# Patient Record
Sex: MALE | Race: WHITE | NOT HISPANIC OR LATINO | Employment: OTHER
[De-identification: names, ages, dates, MRNs, and addresses within clinical notes are randomized per-mention and may not be internally consistent; named-entity substitution may affect disease eponyms.]

---

## 2017-03-28 ENCOUNTER — MYAURORA ACCOUNT LINK (OUTPATIENT)
Dept: OTHER | Age: 77
End: 2017-03-28

## 2017-03-28 ENCOUNTER — CHARTING TRANS (OUTPATIENT)
Dept: URGENT CARE | Age: 77
End: 2017-03-28

## 2017-03-28 ASSESSMENT — PAIN SCALES - GENERAL: PAINLEVEL_OUTOF10: 7

## 2017-03-31 ENCOUNTER — CHARTING TRANS (OUTPATIENT)
Dept: OTHER | Age: 77
End: 2017-03-31

## 2017-04-14 ENCOUNTER — LAB SERVICES (OUTPATIENT)
Dept: OTHER | Age: 77
End: 2017-04-14

## 2017-04-14 LAB
ALBUMIN SERPL BCG-MCNC: 3.9 G/DL (ref 3.6–5.1)
ALP SERPL-CCNC: 65 U/L (ref 30–130)
ALT SERPL W/O P-5'-P-CCNC: 13 U/L (ref 10–35)
AST SERPL-CCNC: 9 U/L (ref 9–37)
BILIRUB SERPL-MCNC: 0.6 MG/DL (ref 0–1)
BUN SERPL-MCNC: 19 MG/DL (ref 6–27)
CALCIUM SERPL-MCNC: 9.5 MG/DL (ref 8.6–10.6)
CHLORIDE SERPL-SCNC: 104 MMOL/L (ref 96–107)
CHOLEST SERPL-MCNC: 165 MG/DL (ref 140–200)
CREATININE, SERUM: 1.8 MG/DL (ref 0.6–1.6)
EST. AVERAGE GLUCOSE BLD GHB EST-MCNC: 189 MG/DL (ref 0–154)
GFR SERPL CREATININE-BSD FRML MDRD: 37 ML/MIN/{1.73M2}
GFR SERPL CREATININE-BSD FRML MDRD: 45 ML/MIN/{1.73M2}
GLUCOSE P FAST SERPL-MCNC: 162 MG/DL (ref 60–100)
HBA1C MFR BLD: 8.2 % (ref 4.2–6)
HCO3 SERPL-SCNC: 28 MMOL/L (ref 22–32)
HDLC SERPL-MCNC: 43 MG/DL
LDLC SERPL CALC-MCNC: 88 MG/DL (ref 0–100)
POTASSIUM SERPL-SCNC: 4.8 MMOL/L (ref 3.5–5.3)
PROT SERPL-MCNC: 6.9 G/DL (ref 6.2–8.1)
PSA SERPL-MCNC: 5.08 NG/ML (ref 0–6.4)
SODIUM SERPL-SCNC: 141 MMOL/L (ref 136–146)
TRIGL SERPL-MCNC: 167 MG/DL (ref 0–200)

## 2017-04-20 ENCOUNTER — CHARTING TRANS (OUTPATIENT)
Dept: OTHER | Age: 77
End: 2017-04-20

## 2017-04-22 ENCOUNTER — CHARTING TRANS (OUTPATIENT)
Dept: OTHER | Age: 77
End: 2017-04-22

## 2017-04-22 ENCOUNTER — LAB SERVICES (OUTPATIENT)
Dept: OTHER | Age: 77
End: 2017-04-22

## 2017-04-22 LAB
BILIRUBIN URINE: NEGATIVE
BLOOD URINE: NEGATIVE
CLARITY: CLEAR
COLOR: YELLOW
GLUCOSE QUALITATIVE U: NEGATIVE
KETONES, URINE: NEGATIVE
LEUKOCYTE ESTERASE URINE: ABNORMAL
NITRITE URINE: NEGATIVE
PH URINE: 5 (ref 5–7)
SPECIFIC GRAVITY URINE: 1.02 (ref 1–1.03)
URINE PROTEIN, QUAL (DIPSTICK): 100
UROBILINOGEN URINE: <2

## 2017-04-24 LAB
ALBUMIN/CREAT UR: 293.2 MG/G (ref 0–30)
CREAT UR-MCNC: 123.8 MG/DL
FINAL REPORT: NORMAL
MICROALBUMIN UR-MCNC: 362.9 MG/L

## 2017-04-25 ENCOUNTER — IMAGING SERVICES (OUTPATIENT)
Dept: OTHER | Age: 77
End: 2017-04-25

## 2017-05-02 ENCOUNTER — CHARTING TRANS (OUTPATIENT)
Dept: OTHER | Age: 77
End: 2017-05-02

## 2017-05-09 ENCOUNTER — CHARTING TRANS (OUTPATIENT)
Dept: NEPHROLOGY | Age: 77
End: 2017-05-09

## 2017-05-09 ENCOUNTER — CHARTING TRANS (OUTPATIENT)
Dept: OTHER | Age: 77
End: 2017-05-09

## 2017-05-09 ENCOUNTER — MYAURORA ACCOUNT LINK (OUTPATIENT)
Dept: OTHER | Age: 77
End: 2017-05-09

## 2017-05-09 ENCOUNTER — LAB SERVICES (OUTPATIENT)
Dept: OTHER | Age: 77
End: 2017-05-09

## 2017-05-09 LAB
BUN SERPL-MCNC: 13 MG/DL (ref 6–27)
CALCIUM SERPL-MCNC: 9.4 MG/DL (ref 8.6–10.6)
CHLORIDE SERPL-SCNC: 103 MMOL/L (ref 96–107)
CREATININE, SERUM: 1.2 MG/DL (ref 0.6–1.6)
DIFFERENTIAL TYPE: ABNORMAL
GFR SERPL CREATININE-BSD FRML MDRD: 59 ML/MIN/{1.73M2}
GFR SERPL CREATININE-BSD FRML MDRD: >60 ML/MIN/{1.73M2}
GLUCOSE SERPL-MCNC: 134 MG/DL (ref 70–200)
HCO3 SERPL-SCNC: 26 MMOL/L (ref 22–32)
HEMATOCRIT: 42 % (ref 40–51)
HEMOGLOBIN: 14.6 G/DL (ref 13.7–17.5)
MEAN CORPUSCULAR HGB CONCENTRATION: 34.8 % (ref 32–36)
MEAN CORPUSCULAR HGB: 32.2 PG (ref 27–34)
MEAN CORPUSCULAR VOLUME: 92.5 FL (ref 79–95)
MEAN PLATELET VOLUME: 10 FL (ref 8.6–12.4)
PLATELET COUNT: 245 10*3/UL (ref 150–400)
POTASSIUM SERPL-SCNC: 4.7 MMOL/L (ref 3.5–5.3)
RED BLOOD CELL COUNT: 4.54 10*6/UL (ref 3.9–5.7)
RED CELL DISTRIBUTION WIDTH: 13.9 % (ref 11.3–14.8)
SODIUM SERPL-SCNC: 140 MMOL/L (ref 136–146)
WHITE BLOOD CELL COUNT: 11.2 10*3/UL (ref 4–10)

## 2017-05-11 LAB
IGA SERPL-MCNC: 211 MG/DL (ref 82–453)
IGG SERPL-MCNC: 1070 MG/DL (ref 751–1560)
IGM SERPL-MCNC: 46 MG/DL (ref 46–304)
KAPPA LC FREE SER-MCNC: 11.6 MG/DL (ref 0.33–1.94)
KAPPA LC/LAMBDA SER: 4.16 {RATIO} (ref 0.26–1.65)
LAMBDA LC FREE SERPL-MCNC: 2.79 MG/DL (ref 0.57–2.63)
PATH SERP REV: ABNORMAL

## 2017-05-16 ENCOUNTER — CHARTING TRANS (OUTPATIENT)
Dept: UROLOGY | Age: 77
End: 2017-05-16

## 2017-05-28 ENCOUNTER — CHARTING TRANS (OUTPATIENT)
Dept: OTHER | Age: 77
End: 2017-05-28

## 2017-05-31 ENCOUNTER — CHARTING TRANS (OUTPATIENT)
Dept: OTHER | Age: 77
End: 2017-05-31

## 2017-06-13 ENCOUNTER — CHARTING TRANS (OUTPATIENT)
Dept: HEMATOLOGY/ONCOLOGY | Age: 77
End: 2017-06-13

## 2017-06-13 ASSESSMENT — PAIN SCALES - GENERAL: PAINLEVEL_OUTOF10: 0

## 2017-06-14 ENCOUNTER — CHARTING TRANS (OUTPATIENT)
Dept: OTHER | Age: 77
End: 2017-06-14

## 2017-06-20 ENCOUNTER — LAB SERVICES (OUTPATIENT)
Dept: OTHER | Age: 77
End: 2017-06-20

## 2017-06-20 LAB
ALBUMIN SERPL BCG-MCNC: 4.1 G/DL (ref 3.6–5.1)
ALP SERPL-CCNC: 84 U/L (ref 30–130)
ALT SERPL W/O P-5'-P-CCNC: 30 U/L (ref 17–47)
AST SERPL-CCNC: 17 U/L (ref 14–43)
BASOPHIL %: 0.5 % (ref 0–1.2)
BASOPHIL ABSOLUTE #: 0.1 10*3/UL (ref 0–0.1)
BILIRUB SERPL-MCNC: 0.6 MG/DL (ref 0–1.3)
BUN SERPL-MCNC: 17 MG/DL (ref 6–27)
CALCIUM SERPL-MCNC: 9.4 MG/DL (ref 8.6–10.6)
CHLORIDE SERPL-SCNC: 105 MMOL/L (ref 96–107)
CREATININE, SERUM: 1.4 MG/DL (ref 0.6–1.6)
DIFFERENTIAL TYPE: ABNORMAL
EOSINOPHIL %: 9 % (ref 0–10)
EOSINOPHIL ABSOLUTE #: 1 10*3/UL (ref 0–0.5)
GFR SERPL CREATININE-BSD FRML MDRD: 49 ML/MIN/{1.73M2}
GFR SERPL CREATININE-BSD FRML MDRD: 60 ML/MIN/{1.73M2}
GLUCOSE SERPL-MCNC: 147 MG/DL (ref 70–200)
HCO3 SERPL-SCNC: 23 MMOL/L (ref 22–32)
HEMATOCRIT: 45.5 % (ref 40–51)
HEMOGLOBIN: 15.7 G/DL (ref 13.7–17.5)
LYMPH PERCENT: 15.8 % (ref 20.5–51.1)
LYMPHOCYTE ABSOLUTE #: 1.8 10*3/UL (ref 1.2–3.4)
MEAN CORPUSCULAR HGB CONCENTRATION: 34.5 % (ref 32–36)
MEAN CORPUSCULAR HGB: 32.7 PG (ref 27–34)
MEAN CORPUSCULAR VOLUME: 94.8 FL (ref 79–95)
MEAN PLATELET VOLUME: 11.3 FL (ref 8.6–12.4)
MONOCYTE ABSOLUTE #: 1 10*3/UL (ref 0.2–0.9)
MONOCYTE PERCENT: 9.3 % (ref 4.3–12.9)
NEUTROPHIL ABSOLUTE #: 7.3 10*3/UL (ref 1.4–6.5)
NEUTROPHIL PERCENT: 65.4 % (ref 34–73.5)
PLATELET COUNT: 254 10*3/UL (ref 150–400)
POTASSIUM SERPL-SCNC: 4.8 MMOL/L (ref 3.5–5.3)
PROT SERPL-MCNC: 7.3 G/DL (ref 6.4–8.5)
RED BLOOD CELL COUNT: 4.8 10*6/UL (ref 3.9–5.7)
RED CELL DISTRIBUTION WIDTH: 13.6 % (ref 11.3–14.8)
SODIUM SERPL-SCNC: 140 MMOL/L (ref 136–146)
WHITE BLOOD CELL COUNT: 11.2 10*3/UL (ref 4–10)

## 2017-06-21 ENCOUNTER — IMAGING SERVICES (OUTPATIENT)
Dept: OTHER | Age: 77
End: 2017-06-21

## 2017-06-21 LAB — B2 MICROGLOB SERPL-MCNC: 3.9 MG/L (ref 1.2–3.5)

## 2017-06-23 LAB
IGA SERPL-MCNC: 252 MG/DL (ref 82–453)
IGG SERPL-MCNC: 1170 MG/DL (ref 751–1560)
IGM SERPL-MCNC: 57 MG/DL (ref 46–304)
KAPPA LC FREE SER-MCNC: 12.5 MG/DL (ref 0.33–1.94)
KAPPA LC/LAMBDA SER: 4.66 {RATIO} (ref 0.26–1.65)
LAMBDA LC FREE SERPL-MCNC: 2.68 MG/DL (ref 0.57–2.63)
PATH INTERP SPEC-IMP: ABNORMAL
PATH SERP REV: ABNORMAL
PROT 24H UR-MRATE: 1102.5 MG/24 HR (ref 0–148)
PROT ?TM UR-MCNC: 45 MG/DL

## 2017-06-27 ENCOUNTER — CHARTING TRANS (OUTPATIENT)
Dept: HEMATOLOGY/ONCOLOGY | Age: 77
End: 2017-06-27

## 2017-06-27 ENCOUNTER — CHARTING TRANS (OUTPATIENT)
Dept: OTHER | Age: 77
End: 2017-06-27

## 2017-06-27 ASSESSMENT — PAIN SCALES - GENERAL: PAINLEVEL_OUTOF10: 0

## 2017-07-05 ENCOUNTER — CHARTING TRANS (OUTPATIENT)
Dept: OTHER | Age: 77
End: 2017-07-05

## 2017-07-19 ENCOUNTER — LAB SERVICES (OUTPATIENT)
Dept: OTHER | Age: 77
End: 2017-07-19

## 2017-07-19 ENCOUNTER — CHARTING TRANS (OUTPATIENT)
Dept: HEMATOLOGY/ONCOLOGY | Age: 77
End: 2017-07-19

## 2017-07-19 LAB
DIFFERENTIAL TYPE: ABNORMAL
HEMATOCRIT: 47.9 % (ref 40–51)
HEMOGLOBIN: 16.4 G/DL (ref 13.7–17.5)
LYMPH PERCENT: 10.9 % (ref 20.5–51.1)
LYMPHOCYTE ABSOLUTE #: 1.3 10*3/UL (ref 1.2–3.4)
MEAN CORPUSCULAR HGB CONCENTRATION: 34.2 % (ref 32–36)
MEAN CORPUSCULAR HGB: 32 PG (ref 27–34)
MEAN CORPUSCULAR VOLUME: 93.6 FL (ref 79–95)
MEAN PLATELET VOLUME: 9.1 FL (ref 8.6–12.4)
MIXED %: 11.4 % (ref 4.3–12.9)
MIXED ABSOLUTE #: 1.4 10*3/UL (ref 0.2–0.9)
NEUTROPHIL ABSOLUTE #: 9.6 10*3/UL (ref 1.4–6.5)
NEUTROPHIL PERCENT: 77.7 % (ref 34–73.5)
PLATELET COUNT: 245 10*3/UL (ref 150–400)
RED BLOOD CELL COUNT: 5.12 10*6/UL (ref 3.9–5.7)
RED CELL DISTRIBUTION WIDTH: 13 % (ref 11.3–14.8)
WHITE BLOOD CELL COUNT: 12.3 10*3/UL (ref 4–10)

## 2017-07-19 ASSESSMENT — PAIN SCALES - GENERAL: PAINLEVEL_OUTOF10: 0

## 2017-07-20 LAB
SERVICE CMNT-IMP: NORMAL
SPECIMEN SOURCE: NORMAL
SPECIMEN TYPE: NORMAL

## 2017-07-24 LAB — AP REPORT: NORMAL

## 2017-07-25 ENCOUNTER — CHARTING TRANS (OUTPATIENT)
Dept: OTHER | Age: 77
End: 2017-07-25

## 2017-07-25 LAB — FISH MULTIPLE MYELOMA: NORMAL

## 2017-07-26 LAB
CHROMOSOMES ANALYSIS BONE MARROW: NORMAL
SERVICE CMNT-IMP: NORMAL

## 2017-07-27 ENCOUNTER — CHARTING TRANS (OUTPATIENT)
Dept: OTHER | Age: 77
End: 2017-07-27

## 2017-08-22 ENCOUNTER — CHARTING TRANS (OUTPATIENT)
Dept: OTHER | Age: 77
End: 2017-08-22

## 2017-09-05 ENCOUNTER — CHARTING TRANS (OUTPATIENT)
Dept: OTHER | Age: 77
End: 2017-09-05

## 2017-09-05 ENCOUNTER — MYAURORA ACCOUNT LINK (OUTPATIENT)
Dept: OTHER | Age: 77
End: 2017-09-05

## 2017-09-12 ENCOUNTER — CHARTING TRANS (OUTPATIENT)
Dept: OTHER | Age: 77
End: 2017-09-12

## 2017-09-28 ENCOUNTER — CHARTING TRANS (OUTPATIENT)
Dept: OTHER | Age: 77
End: 2017-09-28

## 2017-10-12 ENCOUNTER — LAB SERVICES (OUTPATIENT)
Dept: OTHER | Age: 77
End: 2017-10-12

## 2017-10-12 LAB
ALBUMIN SERPL BCG-MCNC: 4 G/DL (ref 3.6–5.1)
ALP SERPL-CCNC: 63 U/L (ref 30–130)
ALT SERPL W/O P-5'-P-CCNC: 26 U/L (ref 17–47)
AST SERPL-CCNC: 17 U/L (ref 14–43)
BASOPHIL %: 0.6 % (ref 0–1.2)
BASOPHIL ABSOLUTE #: 0.1 10*3/UL (ref 0–0.1)
BILIRUB SERPL-MCNC: 0.6 MG/DL (ref 0–1.3)
BUN SERPL-MCNC: 23 MG/DL (ref 6–27)
CALCIUM SERPL-MCNC: 9.3 MG/DL (ref 8.6–10.6)
CHLORIDE SERPL-SCNC: 105 MMOL/L (ref 96–107)
CREATININE, SERUM: 1.4 MG/DL (ref 0.6–1.6)
DIFFERENTIAL TYPE: ABNORMAL
EOSINOPHIL %: 3.6 % (ref 0–10)
EOSINOPHIL ABSOLUTE #: 0.4 10*3/UL (ref 0–0.5)
EST. AVERAGE GLUCOSE BLD GHB EST-MCNC: 169 MG/DL (ref 0–154)
GFR SERPL CREATININE-BSD FRML MDRD: 49 ML/MIN/{1.73M2}
GFR SERPL CREATININE-BSD FRML MDRD: 60 ML/MIN/{1.73M2}
GLUCOSE SERPL-MCNC: 140 MG/DL (ref 70–200)
HBA1C MFR BLD: 7.5 % (ref 4.2–6)
HCO3 SERPL-SCNC: 25 MMOL/L (ref 22–32)
HEMATOCRIT: 45.9 % (ref 40–51)
HEMOGLOBIN: 15.2 G/DL (ref 13.7–17.5)
LYMPH PERCENT: 16.9 % (ref 20.5–51.1)
LYMPHOCYTE ABSOLUTE #: 1.7 10*3/UL (ref 1.2–3.4)
MEAN CORPUSCULAR HGB CONCENTRATION: 33.1 % (ref 32–36)
MEAN CORPUSCULAR HGB: 31.3 PG (ref 27–34)
MEAN CORPUSCULAR VOLUME: 94.4 FL (ref 79–95)
MEAN PLATELET VOLUME: 10.7 FL (ref 8.6–12.4)
MONOCYTE ABSOLUTE #: 0.9 10*3/UL (ref 0.2–0.9)
MONOCYTE PERCENT: 9 % (ref 4.3–12.9)
NEUTROPHIL ABSOLUTE #: 7 10*3/UL (ref 1.4–6.5)
NEUTROPHIL PERCENT: 69.9 % (ref 34–73.5)
PLATELET COUNT: 240 10*3/UL (ref 150–400)
POTASSIUM SERPL-SCNC: 4.8 MMOL/L (ref 3.5–5.3)
PROT SERPL-MCNC: 7.1 G/DL (ref 6.4–8.5)
RED BLOOD CELL COUNT: 4.86 10*6/UL (ref 3.9–5.7)
RED CELL DISTRIBUTION WIDTH: 13.9 % (ref 11.3–14.8)
SODIUM SERPL-SCNC: 139 MMOL/L (ref 136–146)
WHITE BLOOD CELL COUNT: 10 10*3/UL (ref 4–10)

## 2017-10-13 LAB — B2 MICROGLOB SERPL-MCNC: 3.5 MG/L (ref 1.2–3.5)

## 2017-10-16 ENCOUNTER — LAB SERVICES (OUTPATIENT)
Dept: OTHER | Age: 77
End: 2017-10-16

## 2017-10-16 ENCOUNTER — CHARTING TRANS (OUTPATIENT)
Dept: OTHER | Age: 77
End: 2017-10-16

## 2017-10-17 LAB
IGA SERPL-MCNC: 251 MG/DL (ref 82–453)
IGG SERPL-MCNC: 1140 MG/DL (ref 751–1560)
IGM SERPL-MCNC: 50 MG/DL (ref 46–304)
KAPPA LC FREE SER-MCNC: 10.5 MG/DL (ref 0.33–1.94)
KAPPA LC/LAMBDA SER: 3.98 {RATIO} (ref 0.26–1.65)
LAMBDA LC FREE SERPL-MCNC: 2.64 MG/DL (ref 0.57–2.63)
PATH SERP REV: ABNORMAL

## 2017-10-18 LAB
PATH INTERP SPEC-IMP: ABNORMAL
PROT 24H UR-MRATE: 1593 MG/24 HR (ref 0–148)
PROT ?TM UR-MCNC: 59 MG/DL

## 2017-10-19 ENCOUNTER — CHARTING TRANS (OUTPATIENT)
Dept: HEMATOLOGY/ONCOLOGY | Age: 77
End: 2017-10-19

## 2017-10-19 ASSESSMENT — PAIN SCALES - GENERAL: PAINLEVEL_OUTOF10: 2

## 2017-12-04 ENCOUNTER — CHARTING TRANS (OUTPATIENT)
Dept: OTHER | Age: 77
End: 2017-12-04

## 2017-12-14 ENCOUNTER — CHARTING TRANS (OUTPATIENT)
Dept: OTHER | Age: 77
End: 2017-12-14

## 2018-03-12 ENCOUNTER — CHARTING TRANS (OUTPATIENT)
Dept: OTHER | Age: 78
End: 2018-03-12

## 2018-04-11 ENCOUNTER — LAB SERVICES (OUTPATIENT)
Dept: OTHER | Age: 78
End: 2018-04-11

## 2018-04-11 ENCOUNTER — CHARTING TRANS (OUTPATIENT)
Dept: OTHER | Age: 78
End: 2018-04-11

## 2018-04-11 LAB
ALBUMIN SERPL-MCNC: 4.1 G/DL (ref 3.6–5.1)
ALP SERPL-CCNC: 66 U/L (ref 30–130)
ALT SERPL-CCNC: 23 U/L (ref 17–47)
AST SERPL-CCNC: 20 U/L (ref 14–43)
BILIRUB SERPL-MCNC: 0.5 MG/DL (ref 0–1.3)
BUN SERPL-MCNC: 22 MG/DL (ref 6–27)
CALCIUM SERPL-MCNC: 9.9 MG/DL (ref 8.6–10.6)
CHLORIDE SERPL-SCNC: 104 MMOL/L (ref 96–107)
CHOLEST SERPL-MCNC: 155 MG/DL (ref 140–200)
CO2 SERPL-SCNC: 23 MMOL/L (ref 22–32)
CREAT SERPL-MCNC: 1.5 MG/DL (ref 0.6–1.6)
EST. AVERAGE GLUCOSE BLD GHB EST-MCNC: 179 MG/DL (ref 0–154)
GFR SERPL CREATININE-BSD FRML MDRD: 45 ML/MIN/{1.73M2}
GFR SERPL CREATININE-BSD FRML MDRD: 55 ML/MIN/{1.73M2}
GLUCOSE P FAST SERPL-MCNC: 109 MG/DL (ref 60–100)
HBA1C BLD-MCNC: 7.9 % (ref 4.2–6)
HDLC SERPL-MCNC: 37 MG/DL
LDLC SERPL CALC-MCNC: 73 MG/DL (ref 30–100)
POTASSIUM SERPL-SCNC: 4.6 MMOL/L (ref 3.5–5.3)
PROT SERPL-MCNC: 7.1 G/DL (ref 6.4–8.5)
PSA SERPL-MCNC: 3.04 NG/ML (ref 0–6.4)
SODIUM SERPL-SCNC: 140 MMOL/L (ref 136–146)
TRIGL SERPL-MCNC: 223 MG/DL (ref 0–200)

## 2018-04-15 ENCOUNTER — CHARTING TRANS (OUTPATIENT)
Dept: OTHER | Age: 78
End: 2018-04-15

## 2018-04-16 ENCOUNTER — LAB SERVICES (OUTPATIENT)
Dept: OTHER | Age: 78
End: 2018-04-16

## 2018-04-16 ENCOUNTER — MYAURORA ACCOUNT LINK (OUTPATIENT)
Dept: OTHER | Age: 78
End: 2018-04-16

## 2018-04-16 LAB
ALBUMIN SERPL-MCNC: 4.1 G/DL (ref 3.6–5.1)
ALP SERPL-CCNC: 76 U/L (ref 30–130)
ALT SERPL-CCNC: 31 U/L (ref 17–47)
AST SERPL-CCNC: 16 U/L (ref 14–43)
BASOPHIL %: 0.3 % (ref 0–1.2)
BASOPHIL ABSOLUTE #: 0 10*3/UL (ref 0–0.1)
BILIRUB SERPL-MCNC: 0.3 MG/DL (ref 0–1.3)
BUN SERPL-MCNC: 20 MG/DL (ref 6–27)
CALCIUM SERPL-MCNC: 9.6 MG/DL (ref 8.6–10.6)
CHLORIDE SERPL-SCNC: 100 MMOL/L (ref 96–107)
CO2 SERPL-SCNC: 26 MMOL/L (ref 22–32)
CREAT SERPL-MCNC: 1.6 MG/DL (ref 0.6–1.6)
DIFFERENTIAL TYPE: ABNORMAL
EOSINOPHIL %: 3.8 % (ref 0–10)
EOSINOPHIL ABSOLUTE #: 0.4 10*3/UL (ref 0–0.5)
GFR SERPL CREATININE-BSD FRML MDRD: 42 ML/MIN/{1.73M2}
GFR SERPL CREATININE-BSD FRML MDRD: 51 ML/MIN/{1.73M2}
GLUCOSE SERPL-MCNC: 173 MG/DL (ref 70–200)
HEMATOCRIT: 45.7 % (ref 40–51)
HEMOGLOBIN: 15.7 G/DL (ref 13.7–17.5)
LYMPH PERCENT: 18.6 % (ref 20.5–51.1)
LYMPHOCYTE ABSOLUTE #: 1.8 10*3/UL (ref 1.2–3.4)
MEAN CORPUSCULAR HGB CONCENTRATION: 34.4 % (ref 32–36)
MEAN CORPUSCULAR HGB: 32.6 PG (ref 27–34)
MEAN CORPUSCULAR VOLUME: 95 FL (ref 79–95)
MEAN PLATELET VOLUME: 10 FL (ref 8.6–12.4)
MONOCYTE ABSOLUTE #: 0.9 10*3/UL (ref 0.2–0.9)
MONOCYTE PERCENT: 9.6 % (ref 4.3–12.9)
NEUTROPHIL ABSOLUTE #: 6.5 10*3/UL (ref 1.4–6.5)
NEUTROPHIL PERCENT: 67.7 % (ref 34–73.5)
PLATELET COUNT: 242 10*3/UL (ref 150–400)
POTASSIUM SERPL-SCNC: 5 MMOL/L (ref 3.5–5.3)
PROT SERPL-MCNC: 7.1 G/DL (ref 6.4–8.5)
RED BLOOD CELL COUNT: 4.81 10*6/UL (ref 3.9–5.7)
RED CELL DISTRIBUTION WIDTH: 13.7 % (ref 11.3–14.8)
SODIUM SERPL-SCNC: 139 MMOL/L (ref 136–146)
WHITE BLOOD CELL COUNT: 9.6 10*3/UL (ref 4–10)

## 2018-04-17 LAB
ALBUMIN/CREAT UR: 836.5 MG/G (ref 0–30)
B2 MICROGLOB SERPL-MCNC: 3.4 MG/L (ref 1.2–3.5)
CREAT UR-MCNC: 78.8 MG/DL
MICROALBUMIN UR-MCNC: 659 MG/L

## 2018-04-18 LAB
IGA SERPL-MCNC: 257 MG/DL (ref 82–453)
IGG SERPL-MCNC: 1230 MG/DL (ref 751–1560)
IGM SERPL-MCNC: 54 MG/DL (ref 46–304)
KAPPA LC FREE SER-MCNC: 11.5 MG/DL (ref 0.33–1.94)
KAPPA LC/LAMBDA SER: 4.23 {RATIO} (ref 0.26–1.65)
LAMBDA LC FREE SERPL-MCNC: 2.72 MG/DL (ref 0.57–2.63)
PATH SERP REV: ABNORMAL

## 2018-04-20 ENCOUNTER — CHARTING TRANS (OUTPATIENT)
Dept: OTHER | Age: 78
End: 2018-04-20

## 2018-04-25 ENCOUNTER — CHARTING TRANS (OUTPATIENT)
Dept: OTHER | Age: 78
End: 2018-04-25

## 2018-04-25 ASSESSMENT — PAIN SCALES - GENERAL: PAINLEVEL_OUTOF10: 0

## 2018-04-30 ENCOUNTER — CHARTING TRANS (OUTPATIENT)
Dept: OTHER | Age: 78
End: 2018-04-30

## 2018-04-30 ENCOUNTER — MYAURORA ACCOUNT LINK (OUTPATIENT)
Dept: OTHER | Age: 78
End: 2018-04-30

## 2018-05-04 ENCOUNTER — CHARTING TRANS (OUTPATIENT)
Dept: OTHER | Age: 78
End: 2018-05-04

## 2018-05-16 ENCOUNTER — CHARTING TRANS (OUTPATIENT)
Dept: OTHER | Age: 78
End: 2018-05-16

## 2018-05-17 ENCOUNTER — CHARTING TRANS (OUTPATIENT)
Dept: OTHER | Age: 78
End: 2018-05-17

## 2018-05-25 ENCOUNTER — CHARTING TRANS (OUTPATIENT)
Dept: OTHER | Age: 78
End: 2018-05-25

## 2018-06-05 ENCOUNTER — CHARTING TRANS (OUTPATIENT)
Dept: OTHER | Age: 78
End: 2018-06-05

## 2018-06-05 ENCOUNTER — MYAURORA ACCOUNT LINK (OUTPATIENT)
Dept: OTHER | Age: 78
End: 2018-06-05

## 2018-06-13 ENCOUNTER — CHARTING TRANS (OUTPATIENT)
Dept: OTHER | Age: 78
End: 2018-06-13

## 2018-06-21 ENCOUNTER — CHARTING TRANS (OUTPATIENT)
Dept: OTHER | Age: 78
End: 2018-06-21

## 2018-06-26 ENCOUNTER — CHARTING TRANS (OUTPATIENT)
Dept: OTHER | Age: 78
End: 2018-06-26

## 2018-07-30 ENCOUNTER — CHARTING TRANS (OUTPATIENT)
Dept: OTHER | Age: 78
End: 2018-07-30

## 2018-08-06 ENCOUNTER — CHARTING TRANS (OUTPATIENT)
Dept: OTHER | Age: 78
End: 2018-08-06

## 2018-08-24 ENCOUNTER — CHARTING TRANS (OUTPATIENT)
Dept: OTHER | Age: 78
End: 2018-08-24

## 2018-08-29 ENCOUNTER — CHARTING TRANS (OUTPATIENT)
Dept: OTHER | Age: 78
End: 2018-08-29

## 2018-09-11 ENCOUNTER — MYAURORA ACCOUNT LINK (OUTPATIENT)
Dept: OTHER | Age: 78
End: 2018-09-11

## 2018-09-19 ENCOUNTER — CHARTING TRANS (OUTPATIENT)
Dept: OTHER | Age: 78
End: 2018-09-19

## 2018-09-19 ENCOUNTER — LAB SERVICES (OUTPATIENT)
Dept: OTHER | Age: 78
End: 2018-09-19

## 2018-09-19 LAB
ALBUMIN/CREAT UR: 743.4 MG/G (ref 0–30)
BASOPHIL %: 0.8 % (ref 0–1.2)
BASOPHIL ABSOLUTE #: 0.1 10*3/UL (ref 0–0.1)
CHOLEST SERPL-MCNC: 154 MG/DL (ref 140–200)
CREAT UR-MCNC: 94.2 MG/DL
DIFFERENTIAL TYPE: ABNORMAL
EOSINOPHIL %: 3.8 % (ref 0–10)
EOSINOPHIL ABSOLUTE #: 0.4 10*3/UL (ref 0–0.5)
EST. AVERAGE GLUCOSE BLD GHB EST-MCNC: 182 MG/DL (ref 0–154)
HBA1C BLD-MCNC: 8 % (ref 4.2–6)
HDLC SERPL-MCNC: 36 MG/DL
HEMATOCRIT: 46.3 % (ref 40–51)
HEMOGLOBIN: 15.1 G/DL (ref 13.7–17.5)
LDLC SERPL CALC-MCNC: 70 MG/DL (ref 30–100)
LYMPH PERCENT: 17.5 % (ref 20.5–51.1)
LYMPHOCYTE ABSOLUTE #: 1.7 10*3/UL (ref 1.2–3.4)
MEAN CORPUSCULAR HGB CONCENTRATION: 32.6 % (ref 32–36)
MEAN CORPUSCULAR HGB: 31.3 PG (ref 27–34)
MEAN CORPUSCULAR VOLUME: 95.9 FL (ref 79–95)
MEAN PLATELET VOLUME: 11 FL (ref 8.6–12.4)
MICROALBUMIN UR-MCNC: 700.2 MG/L
MONOCYTE ABSOLUTE #: 0.9 10*3/UL (ref 0.2–0.9)
MONOCYTE PERCENT: 9.2 % (ref 4.3–12.9)
NEUTROPHIL ABSOLUTE #: 6.5 10*3/UL (ref 1.4–6.5)
NEUTROPHIL PERCENT: 68.7 % (ref 34–73.5)
PLATELET COUNT: 233 10*3/UL (ref 150–400)
RED BLOOD CELL COUNT: 4.83 10*6/UL (ref 3.9–5.7)
RED CELL DISTRIBUTION WIDTH: 13.8 % (ref 11.3–14.8)
TRIGL SERPL-MCNC: 241 MG/DL (ref 0–200)
WHITE BLOOD CELL COUNT: 9.5 10*3/UL (ref 4–10)

## 2018-09-20 LAB — B2 MICROGLOB SERPL-MCNC: 3.9 MG/L (ref 1.2–3.5)

## 2018-09-21 LAB
IGA SERPL-MCNC: 266 MG/DL (ref 82–453)
IGG SERPL-MCNC: 1200 MG/DL (ref 751–1560)
IGM SERPL-MCNC: 55 MG/DL (ref 46–304)
KAPPA LC FREE SER-MCNC: 17.3 MG/DL (ref 0.33–1.94)
KAPPA LC/LAMBDA SER: 5.88 {RATIO} (ref 0.26–1.65)
LAMBDA LC FREE SERPL-MCNC: 2.94 MG/DL (ref 0.57–2.63)
PATH SERP REV: ABNORMAL

## 2018-09-26 ENCOUNTER — CHARTING TRANS (OUTPATIENT)
Dept: OTHER | Age: 78
End: 2018-09-26

## 2018-09-26 ENCOUNTER — MYAURORA ACCOUNT LINK (OUTPATIENT)
Dept: OTHER | Age: 78
End: 2018-09-26

## 2018-10-11 ENCOUNTER — CHARTING TRANS (OUTPATIENT)
Dept: OTHER | Age: 78
End: 2018-10-11

## 2018-10-11 ENCOUNTER — MYAURORA ACCOUNT LINK (OUTPATIENT)
Dept: OTHER | Age: 78
End: 2018-10-11

## 2018-10-11 ASSESSMENT — PAIN SCALES - GENERAL: PAINLEVEL_OUTOF10: 0

## 2018-10-12 ENCOUNTER — CHARTING TRANS (OUTPATIENT)
Dept: OTHER | Age: 78
End: 2018-10-12

## 2018-10-12 ENCOUNTER — MYAURORA ACCOUNT LINK (OUTPATIENT)
Dept: OTHER | Age: 78
End: 2018-10-12

## 2018-10-15 ENCOUNTER — LAB SERVICES (OUTPATIENT)
Dept: OTHER | Age: 78
End: 2018-10-15

## 2018-10-15 LAB
BUN SERPL-MCNC: 24 MG/DL (ref 6–27)
CALCIUM SERPL-MCNC: 10.1 MG/DL (ref 8.6–10.6)
CHLORIDE SERPL-SCNC: 103 MMOL/L (ref 96–107)
CO2 SERPL-SCNC: 27 MMOL/L (ref 22–32)
COLLECT DURATION TIME UR: 24 H
CREAT SERPL-MCNC: 1.6 MG/DL (ref 0.6–1.6)
GFR SERPL CREATININE-BSD FRML MDRD: 42 ML/MIN/{1.73M2}
GFR SERPL CREATININE-BSD FRML MDRD: 51 ML/MIN/{1.73M2}
GLUCOSE SERPL-MCNC: 114 MG/DL (ref 70–200)
PHOSPHATE SERPL-MCNC: 4.1 MG/DL (ref 2.5–4.9)
POTASSIUM SERPL-SCNC: 5.1 MMOL/L (ref 3.5–5.3)
PROT 24H UR-MRATE: 1512.5 MG/(24.H) (ref 42–225)
PROT UR-MCNC: 55 MG/DL (ref 0–12)
SODIUM SERPL-SCNC: 141 MMOL/L (ref 136–146)
SPECIMEN VOL ?TM UR: 2750 ML

## 2018-10-16 ENCOUNTER — CHARTING TRANS (OUTPATIENT)
Dept: OTHER | Age: 78
End: 2018-10-16

## 2018-11-09 ENCOUNTER — CHARTING TRANS (OUTPATIENT)
Dept: OTHER | Age: 78
End: 2018-11-09

## 2018-11-28 VITALS
HEIGHT: 69 IN | RESPIRATION RATE: 18 BRPM | TEMPERATURE: 97.5 F | SYSTOLIC BLOOD PRESSURE: 136 MMHG | BODY MASS INDEX: 30.66 KG/M2 | HEART RATE: 67 BPM | WEIGHT: 207 LBS | OXYGEN SATURATION: 96 % | DIASTOLIC BLOOD PRESSURE: 70 MMHG

## 2018-11-28 VITALS
DIASTOLIC BLOOD PRESSURE: 72 MMHG | HEIGHT: 69 IN | BODY MASS INDEX: 30.21 KG/M2 | TEMPERATURE: 98.2 F | RESPIRATION RATE: 18 BRPM | HEART RATE: 91 BPM | SYSTOLIC BLOOD PRESSURE: 122 MMHG | OXYGEN SATURATION: 97 % | WEIGHT: 204 LBS

## 2018-11-28 VITALS
OXYGEN SATURATION: 97 % | HEART RATE: 82 BPM | RESPIRATION RATE: 18 BRPM | WEIGHT: 196 LBS | SYSTOLIC BLOOD PRESSURE: 130 MMHG | TEMPERATURE: 98.8 F | HEIGHT: 69 IN | BODY MASS INDEX: 29.03 KG/M2 | DIASTOLIC BLOOD PRESSURE: 72 MMHG

## 2018-11-28 VITALS
DIASTOLIC BLOOD PRESSURE: 68 MMHG | SYSTOLIC BLOOD PRESSURE: 130 MMHG | RESPIRATION RATE: 16 BRPM | OXYGEN SATURATION: 96 % | TEMPERATURE: 98.3 F | HEART RATE: 101 BPM

## 2018-11-28 VITALS
HEIGHT: 69 IN | WEIGHT: 191 LBS | BODY MASS INDEX: 28.29 KG/M2 | SYSTOLIC BLOOD PRESSURE: 130 MMHG | DIASTOLIC BLOOD PRESSURE: 74 MMHG | HEART RATE: 74 BPM

## 2018-11-28 VITALS
BODY MASS INDEX: 30.42 KG/M2 | DIASTOLIC BLOOD PRESSURE: 82 MMHG | HEART RATE: 76 BPM | WEIGHT: 206 LBS | SYSTOLIC BLOOD PRESSURE: 128 MMHG

## 2018-11-28 VITALS — BODY MASS INDEX: 30.36 KG/M2 | WEIGHT: 205 LBS | HEIGHT: 69 IN | TEMPERATURE: 96.9 F

## 2018-11-28 VITALS
SYSTOLIC BLOOD PRESSURE: 120 MMHG | HEART RATE: 99 BPM | DIASTOLIC BLOOD PRESSURE: 76 MMHG | OXYGEN SATURATION: 93 % | TEMPERATURE: 99.4 F | RESPIRATION RATE: 18 BRPM

## 2018-11-28 VITALS — HEART RATE: 76 BPM | WEIGHT: 208 LBS | SYSTOLIC BLOOD PRESSURE: 128 MMHG | DIASTOLIC BLOOD PRESSURE: 78 MMHG

## 2018-11-29 VITALS
DIASTOLIC BLOOD PRESSURE: 62 MMHG | OXYGEN SATURATION: 97 % | SYSTOLIC BLOOD PRESSURE: 110 MMHG | WEIGHT: 198 LBS | HEART RATE: 58 BPM

## 2018-12-22 ENCOUNTER — TELEPHONE (OUTPATIENT)
Dept: FAMILY MEDICINE | Age: 78
End: 2018-12-22

## 2018-12-24 RX ORDER — GLIPIZIDE 10 MG/1
TABLET ORAL
COMMUNITY
Start: 2018-09-20 | End: 2019-03-18 | Stop reason: SDUPTHER

## 2018-12-24 RX ORDER — FINASTERIDE 5 MG/1
5 TABLET, FILM COATED ORAL
COMMUNITY
Start: 2018-04-27 | End: 2019-03-29 | Stop reason: SDUPTHER

## 2018-12-24 RX ORDER — LISINOPRIL 10 MG/1
TABLET ORAL
COMMUNITY
Start: 2018-06-13 | End: 2020-12-16 | Stop reason: SDUPTHER

## 2018-12-24 RX ORDER — TAMSULOSIN HYDROCHLORIDE 0.4 MG/1
0.8 CAPSULE ORAL
COMMUNITY
Start: 2017-10-16 | End: 2019-01-15 | Stop reason: SDUPTHER

## 2018-12-24 RX ORDER — LANCETS
EACH MISCELLANEOUS
COMMUNITY
Start: 2018-11-09 | End: 2019-04-21 | Stop reason: SDUPTHER

## 2018-12-24 RX ORDER — LANCING DEVICE/LANCETS
KIT MISCELLANEOUS
COMMUNITY
Start: 2018-11-09 | End: 2020-05-11 | Stop reason: ALTCHOICE

## 2018-12-24 RX ORDER — METFORMIN HYDROCHLORIDE 500 MG/1
2000 TABLET, EXTENDED RELEASE ORAL
Qty: 360 TABLET | Refills: 0 | Status: SHIPPED | OUTPATIENT
Start: 2018-12-24 | End: 2019-03-18 | Stop reason: SDUPTHER

## 2018-12-24 RX ORDER — PANTOPRAZOLE SODIUM 40 MG/1
TABLET, DELAYED RELEASE ORAL
COMMUNITY
Start: 2017-04-22 | End: 2019-09-18 | Stop reason: ALTCHOICE

## 2018-12-24 RX ORDER — METFORMIN HYDROCHLORIDE 500 MG/1
TABLET, EXTENDED RELEASE ORAL
COMMUNITY
Start: 2017-09-14 | End: 2018-12-24 | Stop reason: SDUPTHER

## 2018-12-24 RX ORDER — MONTELUKAST SODIUM 10 MG/1
TABLET ORAL
COMMUNITY
Start: 2018-06-21 | End: 2019-09-06 | Stop reason: SDUPTHER

## 2018-12-24 RX ORDER — MINERAL OIL
OIL (ML) MISCELLANEOUS
COMMUNITY
End: 2020-04-29 | Stop reason: ALTCHOICE

## 2019-01-01 ENCOUNTER — EXTERNAL RECORD (OUTPATIENT)
Dept: HEALTH INFORMATION MANAGEMENT | Facility: OTHER | Age: 79
End: 2019-01-01

## 2019-01-09 DIAGNOSIS — Z12.5 SCREENING FOR PROSTATE CANCER: ICD-10-CM

## 2019-01-09 DIAGNOSIS — N18.30 CONTROLLED TYPE 2 DIABETES MELLITUS WITH STAGE 3 CHRONIC KIDNEY DISEASE, WITHOUT LONG-TERM CURRENT USE OF INSULIN (CMD): Primary | ICD-10-CM

## 2019-01-09 DIAGNOSIS — E11.22 CONTROLLED TYPE 2 DIABETES MELLITUS WITH STAGE 3 CHRONIC KIDNEY DISEASE, WITHOUT LONG-TERM CURRENT USE OF INSULIN (CMD): Primary | ICD-10-CM

## 2019-01-10 DIAGNOSIS — D47.2 MGUS (MONOCLONAL GAMMOPATHY OF UNKNOWN SIGNIFICANCE): Primary | ICD-10-CM

## 2019-01-16 RX ORDER — TAMSULOSIN HYDROCHLORIDE 0.4 MG/1
0.8 CAPSULE ORAL DAILY
Qty: 180 CAPSULE | Refills: 1 | Status: SHIPPED | OUTPATIENT
Start: 2019-01-16 | End: 2020-12-14 | Stop reason: SDUPTHER

## 2019-03-05 VITALS
DIASTOLIC BLOOD PRESSURE: 76 MMHG | BODY MASS INDEX: 30.36 KG/M2 | SYSTOLIC BLOOD PRESSURE: 118 MMHG | WEIGHT: 205 LBS | HEIGHT: 69 IN | HEART RATE: 83 BPM

## 2019-03-05 VITALS
HEIGHT: 69 IN | BODY MASS INDEX: 30.81 KG/M2 | SYSTOLIC BLOOD PRESSURE: 142 MMHG | HEART RATE: 68 BPM | DIASTOLIC BLOOD PRESSURE: 82 MMHG | WEIGHT: 208 LBS | RESPIRATION RATE: 20 BRPM | TEMPERATURE: 97.2 F

## 2019-03-05 VITALS
BODY MASS INDEX: 30.36 KG/M2 | TEMPERATURE: 98.1 F | HEART RATE: 83 BPM | DIASTOLIC BLOOD PRESSURE: 76 MMHG | OXYGEN SATURATION: 95 % | RESPIRATION RATE: 18 BRPM | SYSTOLIC BLOOD PRESSURE: 118 MMHG | WEIGHT: 205 LBS | HEIGHT: 69 IN

## 2019-03-05 VITALS — BODY MASS INDEX: 28.63 KG/M2 | WEIGHT: 200 LBS | HEIGHT: 70 IN

## 2019-03-06 VITALS
WEIGHT: 209 LBS | HEART RATE: 88 BPM | OXYGEN SATURATION: 97 % | SYSTOLIC BLOOD PRESSURE: 132 MMHG | DIASTOLIC BLOOD PRESSURE: 70 MMHG | BODY MASS INDEX: 30.86 KG/M2

## 2019-03-06 VITALS
HEART RATE: 80 BPM | WEIGHT: 211 LBS | DIASTOLIC BLOOD PRESSURE: 86 MMHG | TEMPERATURE: 97.9 F | OXYGEN SATURATION: 95 % | BODY MASS INDEX: 31.25 KG/M2 | RESPIRATION RATE: 18 BRPM | SYSTOLIC BLOOD PRESSURE: 140 MMHG | HEIGHT: 69 IN

## 2019-03-06 VITALS
TEMPERATURE: 98 F | DIASTOLIC BLOOD PRESSURE: 70 MMHG | SYSTOLIC BLOOD PRESSURE: 118 MMHG | HEIGHT: 69 IN | RESPIRATION RATE: 16 BRPM | WEIGHT: 205 LBS | BODY MASS INDEX: 30.36 KG/M2 | HEART RATE: 76 BPM

## 2019-03-18 RX ORDER — GLIPIZIDE 10 MG/1
TABLET ORAL
Qty: 135 TABLET | Refills: 0 | Status: SHIPPED | OUTPATIENT
Start: 2019-03-18 | End: 2020-05-11 | Stop reason: ALTCHOICE

## 2019-03-18 RX ORDER — METFORMIN HYDROCHLORIDE 500 MG/1
TABLET, EXTENDED RELEASE ORAL
Qty: 360 TABLET | Refills: 0 | Status: SHIPPED | OUTPATIENT
Start: 2019-03-18 | End: 2019-04-24 | Stop reason: ALTCHOICE

## 2019-03-29 ENCOUNTER — TELEPHONE (OUTPATIENT)
Dept: UROLOGY | Age: 79
End: 2019-03-29

## 2019-03-29 RX ORDER — FINASTERIDE 5 MG/1
TABLET, FILM COATED ORAL
Qty: 90 TABLET | Refills: 2 | Status: SHIPPED | OUTPATIENT
Start: 2019-03-29 | End: 2019-07-09 | Stop reason: SDUPTHER

## 2019-04-15 ENCOUNTER — OFFICE VISIT (OUTPATIENT)
Dept: PODIATRY | Age: 79
End: 2019-04-15

## 2019-04-15 DIAGNOSIS — E11.42 TYPE 2 DIABETES MELLITUS WITH DIABETIC POLYNEUROPATHY, WITHOUT LONG-TERM CURRENT USE OF INSULIN (CMD): Primary | ICD-10-CM

## 2019-04-15 DIAGNOSIS — M20.42 HAMMER TOES OF BOTH FEET: ICD-10-CM

## 2019-04-15 DIAGNOSIS — M20.41 HAMMER TOES OF BOTH FEET: ICD-10-CM

## 2019-04-15 DIAGNOSIS — L60.8 INCURVATED NAIL: ICD-10-CM

## 2019-04-15 PROCEDURE — 99214 OFFICE O/P EST MOD 30 MIN: CPT | Performed by: PODIATRIST

## 2019-04-15 RX ORDER — INSULIN GLARGINE 100 [IU]/ML
30 INJECTION, SOLUTION SUBCUTANEOUS NIGHTLY
COMMUNITY

## 2019-04-16 ENCOUNTER — TELEPHONE (OUTPATIENT)
Dept: INTERNAL MEDICINE | Age: 79
End: 2019-04-16

## 2019-04-17 ENCOUNTER — LAB SERVICES (OUTPATIENT)
Dept: FAMILY MEDICINE | Age: 79
End: 2019-04-17

## 2019-04-17 DIAGNOSIS — E11.22 CONTROLLED TYPE 2 DIABETES MELLITUS WITH STAGE 3 CHRONIC KIDNEY DISEASE, WITHOUT LONG-TERM CURRENT USE OF INSULIN (CMD): ICD-10-CM

## 2019-04-17 DIAGNOSIS — Z12.5 SCREENING FOR PROSTATE CANCER: ICD-10-CM

## 2019-04-17 DIAGNOSIS — N18.30 CONTROLLED TYPE 2 DIABETES MELLITUS WITH STAGE 3 CHRONIC KIDNEY DISEASE, WITHOUT LONG-TERM CURRENT USE OF INSULIN (CMD): ICD-10-CM

## 2019-04-17 LAB
ALBUMIN SERPL-MCNC: 3.7 G/DL (ref 3.6–5.1)
ALP SERPL-CCNC: 67 U/L (ref 45–115)
ALT SERPL W/O P-5'-P-CCNC: 22 U/L (ref 5–49)
AST SERPL-CCNC: 20 U/L (ref 14–43)
BILIRUB SERPL-MCNC: 0.6 MG/DL (ref 0–1.3)
BUN SERPL-MCNC: 24 MG/DL (ref 6–27)
CALCIUM SERPL-MCNC: 9.2 MG/DL (ref 8.6–10.6)
CHLORIDE SERPL-SCNC: 105 MMOL/L (ref 96–107)
CHOLEST SERPL-MCNC: 170 MG/DL (ref 140–200)
CO2 SERPL-SCNC: 25 MMOL/L (ref 22–32)
CREAT SERPL-MCNC: 1.6 MG/DL (ref 0.6–1.6)
EST. AVERAGE GLUCOSE BLD GHB EST-MCNC: 177 MG/DL (ref 0–154)
GFR SERPL CREATININE-BSD FRML MDRD: 42 ML/MIN/{1.73M2}
GFR SERPL CREATININE-BSD FRML MDRD: 51 ML/MIN/{1.73M2}
GLUCOSE P FAST SERPL-MCNC: 157 MG/DL (ref 60–100)
HBA1C BLD-MCNC: 7.8 % (ref 4.2–6)
HDLC SERPL-MCNC: 38 MG/DL
LDLC SERPL CALC-MCNC: 89 MG/DL (ref 30–100)
POTASSIUM SERPL-SCNC: 4.7 MMOL/L (ref 3.5–5.3)
PROT SERPL-MCNC: 7 G/DL (ref 6.4–8.5)
PSA SERPL-MCNC: 1.62 NG/ML (ref 0–6.8)
SODIUM SERPL-SCNC: 138 MMOL/L (ref 136–146)
TRIGL SERPL-MCNC: 214 MG/DL (ref 0–200)

## 2019-04-17 PROCEDURE — 80053 COMPREHEN METABOLIC PANEL: CPT | Performed by: INTERNAL MEDICINE

## 2019-04-17 PROCEDURE — 80061 LIPID PANEL: CPT | Performed by: INTERNAL MEDICINE

## 2019-04-17 PROCEDURE — 36415 COLL VENOUS BLD VENIPUNCTURE: CPT | Performed by: INTERNAL MEDICINE

## 2019-04-17 PROCEDURE — G0103 PSA SCREENING: HCPCS | Performed by: INTERNAL MEDICINE

## 2019-04-17 PROCEDURE — 82043 UR ALBUMIN QUANTITATIVE: CPT | Performed by: INTERNAL MEDICINE

## 2019-04-17 PROCEDURE — 83036 HEMOGLOBIN GLYCOSYLATED A1C: CPT | Performed by: INTERNAL MEDICINE

## 2019-04-17 PROCEDURE — 82570 ASSAY OF URINE CREATININE: CPT | Performed by: INTERNAL MEDICINE

## 2019-04-18 LAB
ALBUMIN/CREAT UR: 610 MG/G (ref 0–30)
CREAT UR-MCNC: 117.2 MG/DL
MICROALBUMIN UR-MCNC: 715.1 MG/L

## 2019-04-23 RX ORDER — LANCETS
EACH MISCELLANEOUS
Qty: 200 EACH | Refills: 3 | Status: SHIPPED | OUTPATIENT
Start: 2019-04-23 | End: 2020-05-11 | Stop reason: ALTCHOICE

## 2019-04-24 ENCOUNTER — OFFICE VISIT (OUTPATIENT)
Dept: DERMATOLOGY | Age: 79
End: 2019-04-24

## 2019-04-24 ENCOUNTER — OFFICE VISIT (OUTPATIENT)
Dept: INTERNAL MEDICINE | Age: 79
End: 2019-04-24

## 2019-04-24 VITALS
HEART RATE: 76 BPM | SYSTOLIC BLOOD PRESSURE: 128 MMHG | TEMPERATURE: 98.7 F | DIASTOLIC BLOOD PRESSURE: 74 MMHG | HEIGHT: 69 IN | WEIGHT: 203 LBS | RESPIRATION RATE: 16 BRPM | BODY MASS INDEX: 30.07 KG/M2

## 2019-04-24 DIAGNOSIS — L57.0 ACTINIC KERATOSES: Primary | ICD-10-CM

## 2019-04-24 DIAGNOSIS — D47.2 MGUS (MONOCLONAL GAMMOPATHY OF UNKNOWN SIGNIFICANCE): ICD-10-CM

## 2019-04-24 DIAGNOSIS — E11.9 CONTROLLED TYPE 2 DIABETES MELLITUS WITHOUT COMPLICATION, WITH LONG-TERM CURRENT USE OF INSULIN (CMD): Primary | ICD-10-CM

## 2019-04-24 DIAGNOSIS — Z79.4 CONTROLLED TYPE 2 DIABETES MELLITUS WITHOUT COMPLICATION, WITH LONG-TERM CURRENT USE OF INSULIN (CMD): Primary | ICD-10-CM

## 2019-04-24 DIAGNOSIS — I10 ESSENTIAL HYPERTENSION: ICD-10-CM

## 2019-04-24 PROCEDURE — 17000 DESTRUCT PREMALG LESION: CPT | Performed by: DERMATOLOGY

## 2019-04-24 PROCEDURE — 99213 OFFICE O/P EST LOW 20 MIN: CPT | Performed by: DERMATOLOGY

## 2019-04-24 PROCEDURE — 99214 OFFICE O/P EST MOD 30 MIN: CPT | Performed by: INTERNAL MEDICINE

## 2019-04-24 PROCEDURE — 17003 DESTRUCT PREMALG LES 2-14: CPT | Performed by: DERMATOLOGY

## 2019-04-24 RX ORDER — CHLORAL HYDRATE 500 MG
2 CAPSULE ORAL DAILY
COMMUNITY

## 2019-04-24 SDOH — HEALTH STABILITY: MENTAL HEALTH: HOW OFTEN DO YOU HAVE A DRINK CONTAINING ALCOHOL?: NEVER

## 2019-04-24 ASSESSMENT — ENCOUNTER SYMPTOMS
FEVER: 0
CHILLS: 0
SHORTNESS OF BREATH: 0

## 2019-05-09 ENCOUNTER — OFFICE VISIT (OUTPATIENT)
Dept: UROLOGY | Age: 79
End: 2019-05-09

## 2019-05-09 DIAGNOSIS — N40.1 BENIGN PROSTATIC HYPERPLASIA WITH URINARY FREQUENCY: ICD-10-CM

## 2019-05-09 DIAGNOSIS — R35.0 BENIGN PROSTATIC HYPERPLASIA WITH URINARY FREQUENCY: ICD-10-CM

## 2019-05-09 DIAGNOSIS — C67.9 MALIGNANT NEOPLASM OF URINARY BLADDER, UNSPECIFIED SITE (CMD): Primary | ICD-10-CM

## 2019-05-09 PROCEDURE — 52000 CYSTOURETHROSCOPY: CPT | Performed by: UROLOGY

## 2019-05-09 RX ORDER — CIPROFLOXACIN 500 MG/1
500 TABLET, FILM COATED ORAL 2 TIMES DAILY
Qty: 2 TABLET | Refills: 0 | Status: SHIPPED | OUTPATIENT
Start: 2019-05-09 | End: 2019-05-10

## 2019-05-30 ENCOUNTER — TELEPHONE (OUTPATIENT)
Dept: SCHEDULING | Age: 79
End: 2019-05-30

## 2019-07-08 RX ORDER — BLOOD SUGAR DIAGNOSTIC
STRIP MISCELLANEOUS
Qty: 200 STRIP | Refills: 0 | Status: SHIPPED | OUTPATIENT
Start: 2019-07-08 | End: 2019-10-08 | Stop reason: SDUPTHER

## 2019-07-09 ENCOUNTER — TELEPHONE (OUTPATIENT)
Dept: UROLOGY | Age: 79
End: 2019-07-09

## 2019-07-09 RX ORDER — FINASTERIDE 5 MG/1
5 TABLET, FILM COATED ORAL DAILY
Qty: 90 TABLET | Refills: 3 | Status: SHIPPED | OUTPATIENT
Start: 2019-07-09 | End: 2020-12-21 | Stop reason: SDUPTHER

## 2019-07-23 ENCOUNTER — TELEPHONE (OUTPATIENT)
Dept: INTERNAL MEDICINE | Age: 79
End: 2019-07-23

## 2019-08-14 ENCOUNTER — TELEPHONE (OUTPATIENT)
Dept: PODIATRY | Age: 79
End: 2019-08-14

## 2019-09-06 RX ORDER — MONTELUKAST SODIUM 10 MG/1
10 TABLET ORAL EVERY EVENING
Qty: 90 TABLET | Refills: 1 | Status: SHIPPED | OUTPATIENT
Start: 2019-09-06 | End: 2020-12-14 | Stop reason: SDUPTHER

## 2019-09-17 ENCOUNTER — LAB SERVICES (OUTPATIENT)
Dept: FAMILY MEDICINE | Age: 79
End: 2019-09-17

## 2019-09-17 DIAGNOSIS — Z79.4 CONTROLLED TYPE 2 DIABETES MELLITUS WITHOUT COMPLICATION, WITH LONG-TERM CURRENT USE OF INSULIN (CMD): ICD-10-CM

## 2019-09-17 DIAGNOSIS — E11.9 CONTROLLED TYPE 2 DIABETES MELLITUS WITHOUT COMPLICATION, WITH LONG-TERM CURRENT USE OF INSULIN (CMD): ICD-10-CM

## 2019-09-17 LAB
ALBUMIN SERPL-MCNC: 4.2 G/DL (ref 3.6–5.1)
ALP SERPL-CCNC: 73 U/L (ref 45–115)
ALT SERPL W/O P-5'-P-CCNC: 19 U/L (ref 5–49)
AST SERPL-CCNC: 18 U/L (ref 14–43)
BILIRUB SERPL-MCNC: 0.5 MG/DL (ref 0–1.3)
BUN SERPL-MCNC: 29 MG/DL (ref 6–27)
CALCIUM SERPL-MCNC: 9.5 MG/DL (ref 8.6–10.6)
CHLORIDE SERPL-SCNC: 106 MMOL/L (ref 96–107)
CHOLEST SERPL-MCNC: 179 MG/DL (ref 140–200)
CO2 SERPL-SCNC: 25 MMOL/L (ref 22–32)
CREAT SERPL-MCNC: 1.9 MG/DL (ref 0.6–1.6)
EST. AVERAGE GLUCOSE BLD GHB EST-MCNC: 170 MG/DL (ref 0–154)
GFR SERPL CREATININE-BSD FRML MDRD: 34 ML/MIN/{1.73M2}
GFR SERPL CREATININE-BSD FRML MDRD: 42 ML/MIN/{1.73M2}
GLUCOSE P FAST SERPL-MCNC: 170 MG/DL (ref 60–100)
HBA1C MFR BLD: 7.6 % (ref 4.2–6)
HDLC SERPL-MCNC: 39 MG/DL
LDLC SERPL CALC-MCNC: 105 MG/DL (ref 30–100)
POTASSIUM SERPL-SCNC: 4.7 MMOL/L (ref 3.5–5.3)
PROT SERPL-MCNC: 7.4 G/DL (ref 6.4–8.5)
SODIUM SERPL-SCNC: 142 MMOL/L (ref 136–146)
TRIGL SERPL-MCNC: 175 MG/DL (ref 0–200)

## 2019-09-17 PROCEDURE — 83036 HEMOGLOBIN GLYCOSYLATED A1C: CPT | Performed by: INTERNAL MEDICINE

## 2019-09-17 PROCEDURE — 80053 COMPREHEN METABOLIC PANEL: CPT | Performed by: INTERNAL MEDICINE

## 2019-09-17 PROCEDURE — 82043 UR ALBUMIN QUANTITATIVE: CPT | Performed by: INTERNAL MEDICINE

## 2019-09-17 PROCEDURE — 36415 COLL VENOUS BLD VENIPUNCTURE: CPT | Performed by: INTERNAL MEDICINE

## 2019-09-17 PROCEDURE — 82570 ASSAY OF URINE CREATININE: CPT | Performed by: INTERNAL MEDICINE

## 2019-09-17 PROCEDURE — 80061 LIPID PANEL: CPT | Performed by: INTERNAL MEDICINE

## 2019-09-18 ENCOUNTER — OFFICE VISIT (OUTPATIENT)
Dept: INTERNAL MEDICINE | Age: 79
End: 2019-09-18

## 2019-09-18 ENCOUNTER — OFFICE VISIT (OUTPATIENT)
Dept: DERMATOLOGY | Age: 79
End: 2019-09-18

## 2019-09-18 VITALS
SYSTOLIC BLOOD PRESSURE: 130 MMHG | WEIGHT: 207 LBS | DIASTOLIC BLOOD PRESSURE: 82 MMHG | HEART RATE: 76 BPM | HEIGHT: 69 IN | BODY MASS INDEX: 30.66 KG/M2 | TEMPERATURE: 98.6 F | RESPIRATION RATE: 16 BRPM

## 2019-09-18 DIAGNOSIS — D47.2 MGUS (MONOCLONAL GAMMOPATHY OF UNKNOWN SIGNIFICANCE): ICD-10-CM

## 2019-09-18 DIAGNOSIS — L71.9 ROSACEA: Primary | ICD-10-CM

## 2019-09-18 DIAGNOSIS — Z00.00 MEDICARE ANNUAL WELLNESS VISIT, SUBSEQUENT: ICD-10-CM

## 2019-09-18 DIAGNOSIS — E11.9 CONTROLLED TYPE 2 DIABETES MELLITUS WITHOUT COMPLICATION, WITH LONG-TERM CURRENT USE OF INSULIN (CMD): Primary | ICD-10-CM

## 2019-09-18 DIAGNOSIS — Z79.4 CONTROLLED TYPE 2 DIABETES MELLITUS WITHOUT COMPLICATION, WITH LONG-TERM CURRENT USE OF INSULIN (CMD): Primary | ICD-10-CM

## 2019-09-18 DIAGNOSIS — N18.30 CHRONIC KIDNEY DISEASE (CKD), STAGE III (MODERATE) (CMD): ICD-10-CM

## 2019-09-18 LAB
ALBUMIN/CREAT UR: 672 MG/G (ref 0–30)
CREAT UR-MCNC: 145 MG/DL
MICROALBUMIN UR-MCNC: 974.2 MG/L

## 2019-09-18 PROCEDURE — G0439 PPPS, SUBSEQ VISIT: HCPCS | Performed by: INTERNAL MEDICINE

## 2019-09-18 PROCEDURE — 99214 OFFICE O/P EST MOD 30 MIN: CPT | Performed by: INTERNAL MEDICINE

## 2019-09-18 PROCEDURE — 99214 OFFICE O/P EST MOD 30 MIN: CPT | Performed by: DERMATOLOGY

## 2019-09-18 RX ORDER — OMEPRAZOLE 20 MG/1
40 TABLET, DELAYED RELEASE ORAL DAILY
COMMUNITY
End: 2020-04-29 | Stop reason: ALTCHOICE

## 2019-09-18 SDOH — HEALTH STABILITY: MENTAL HEALTH: HOW OFTEN DO YOU HAVE A DRINK CONTAINING ALCOHOL?: NOT ASKED

## 2019-09-18 SDOH — HEALTH STABILITY: PHYSICAL HEALTH: ON AVERAGE, HOW MANY MINUTES DO YOU ENGAGE IN EXERCISE AT THIS LEVEL?: 0 MIN

## 2019-09-18 SDOH — HEALTH STABILITY: PHYSICAL HEALTH: ON AVERAGE, HOW MANY DAYS PER WEEK DO YOU ENGAGE IN MODERATE TO STRENUOUS EXERCISE (LIKE A BRISK WALK)?: 0 DAYS

## 2019-09-18 ASSESSMENT — PATIENT HEALTH QUESTIONNAIRE - PHQ9
SUM OF ALL RESPONSES TO PHQ9 QUESTIONS 1 AND 2: 0
2. FEELING DOWN, DEPRESSED OR HOPELESS: NOT AT ALL
SUM OF ALL RESPONSES TO PHQ9 QUESTIONS 1 AND 2: 0
1. LITTLE INTEREST OR PLEASURE IN DOING THINGS: NOT AT ALL

## 2019-09-18 ASSESSMENT — ENCOUNTER SYMPTOMS
FEVER: 0
CHILLS: 0
SHORTNESS OF BREATH: 0

## 2019-10-09 RX ORDER — BLOOD SUGAR DIAGNOSTIC
STRIP MISCELLANEOUS
Qty: 200 STRIP | Refills: 1 | Status: SHIPPED | OUTPATIENT
Start: 2019-10-09 | End: 2020-05-11 | Stop reason: ALTCHOICE

## 2020-03-14 ENCOUNTER — TELEPHONE (OUTPATIENT)
Dept: INTERNAL MEDICINE | Age: 80
End: 2020-03-14

## 2020-03-17 DIAGNOSIS — D47.2 GAMMOPATHY, MONOCLONAL: Primary | ICD-10-CM

## 2020-04-09 ENCOUNTER — TELEPHONE (OUTPATIENT)
Dept: INTERNAL MEDICINE | Age: 80
End: 2020-04-09

## 2020-04-14 ENCOUNTER — TELEPHONE (OUTPATIENT)
Dept: INTERNAL MEDICINE | Age: 80
End: 2020-04-14

## 2020-04-14 DIAGNOSIS — R53.1 WEAKNESS: Primary | ICD-10-CM

## 2020-04-14 DIAGNOSIS — R53.83 FATIGUE, UNSPECIFIED TYPE: ICD-10-CM

## 2020-04-14 DIAGNOSIS — E16.2 LOW BLOOD SUGAR: ICD-10-CM

## 2020-04-15 ENCOUNTER — LAB SERVICES (OUTPATIENT)
Dept: LAB | Age: 80
End: 2020-04-15

## 2020-04-15 ENCOUNTER — OFFICE VISIT (OUTPATIENT)
Dept: INTERNAL MEDICINE | Age: 80
End: 2020-04-15

## 2020-04-15 ENCOUNTER — IMAGING SERVICES (OUTPATIENT)
Dept: GENERAL RADIOLOGY | Age: 80
End: 2020-04-15
Attending: INTERNAL MEDICINE

## 2020-04-15 DIAGNOSIS — R53.1 WEAKNESS: ICD-10-CM

## 2020-04-15 DIAGNOSIS — R30.0 DYSURIA: ICD-10-CM

## 2020-04-15 DIAGNOSIS — R63.4 WEIGHT LOSS: ICD-10-CM

## 2020-04-15 DIAGNOSIS — R53.83 FATIGUE, UNSPECIFIED TYPE: ICD-10-CM

## 2020-04-15 DIAGNOSIS — E16.2 LOW BLOOD SUGAR: ICD-10-CM

## 2020-04-15 DIAGNOSIS — N42.9 DISORDER OF PROSTATE, UNSPECIFIED: ICD-10-CM

## 2020-04-15 DIAGNOSIS — Z79.4 CONTROLLED TYPE 2 DIABETES MELLITUS WITHOUT COMPLICATION, WITH LONG-TERM CURRENT USE OF INSULIN (CMD): Primary | ICD-10-CM

## 2020-04-15 DIAGNOSIS — Z79.4 CONTROLLED TYPE 2 DIABETES MELLITUS WITHOUT COMPLICATION, WITH LONG-TERM CURRENT USE OF INSULIN (CMD): ICD-10-CM

## 2020-04-15 DIAGNOSIS — E11.9 CONTROLLED TYPE 2 DIABETES MELLITUS WITHOUT COMPLICATION, WITH LONG-TERM CURRENT USE OF INSULIN (CMD): ICD-10-CM

## 2020-04-15 DIAGNOSIS — E11.9 CONTROLLED TYPE 2 DIABETES MELLITUS WITHOUT COMPLICATION, WITH LONG-TERM CURRENT USE OF INSULIN (CMD): Primary | ICD-10-CM

## 2020-04-15 DIAGNOSIS — N50.819 TESTICLE PAIN: ICD-10-CM

## 2020-04-15 LAB
ALBUMIN SERPL-MCNC: 3.1 G/DL (ref 3.6–5.1)
ALBUMIN/CREAT UR: 365.1 MG/G (ref 0–30)
ALP SERPL-CCNC: 97 U/L (ref 45–115)
ALT SERPL W/O P-5'-P-CCNC: 18 U/L (ref 5–49)
AST SERPL-CCNC: 27 U/L (ref 14–43)
ATYPICAL LYMPH: 1 (ref 0–4)
BANDS: 2 % (ref 0–5)
BILIRUB SERPL-MCNC: 1 MG/DL (ref 0–1.3)
BILIRUBIN URINE: NEGATIVE
BLOOD URINE: ABNORMAL
BUN SERPL-MCNC: 59 MG/DL (ref 6–27)
CALCIUM SERPL-MCNC: 8.4 MG/DL (ref 8.6–10.6)
CHLORIDE SERPL-SCNC: 106 MMOL/L (ref 96–107)
CHOLEST SERPL-MCNC: 96 MG/DL (ref 140–200)
CLARITY: ABNORMAL
CO2 SERPL-SCNC: 21 MMOL/L (ref 22–32)
COLOR: ABNORMAL
CREAT SERPL-MCNC: 2.5 MG/DL (ref 0.6–1.6)
CREAT UR-MCNC: 195.8 MG/DL
DIFFERENTIAL TYPE: ABNORMAL
EST. AVERAGE GLUCOSE BLD GHB EST-MCNC: 133 MG/DL (ref 0–154)
GFR SERPL CREATININE-BSD FRML MDRD: 25 ML/MIN/{1.73M2}
GFR SERPL CREATININE-BSD FRML MDRD: 30 ML/MIN/{1.73M2}
GLUCOSE QUALITATIVE U: NEGATIVE
GLUCOSE SERPL-MCNC: 73 MG/DL (ref 70–200)
HBA1C MFR BLD: 6.3 % (ref 4.2–6)
HDLC SERPL-MCNC: 19 MG/DL
HEMATOCRIT: 38.3 % (ref 40–51)
HEMOGLOBIN: 12.7 G/DL (ref 13.7–17.5)
KETONES, URINE: NEGATIVE
LDLC SERPL CALC-MCNC: 59 MG/DL (ref 30–100)
LEUKOCYTE ESTERASE URINE: ABNORMAL
LYMPH PERCENT MD: 4 % (ref 20.5–51.1)
LYMPHOCYTE ABSOLUTE # MD: 1.3 /UL (ref 1.2–3.4)
MEAN CORPUSCULAR HGB CONCENTRATION: 33.2 % (ref 32–36)
MEAN CORPUSCULAR HGB: 30.5 PG (ref 27–34)
MEAN CORPUSCULAR VOLUME: 91.8 FL (ref 79–95)
MEAN PLATELET VOLUME: 10 FL (ref 8.6–12.4)
MICROALBUMIN UR-MCNC: 714.8 MG/L
MONOCYTE ABSOLUTE # MD: 2.3 /UL (ref 0.2–0.9)
MONOCYTE PERCENT MD: 9 % (ref 4.3–12.9)
NEUTROPHIL ABSOLUTE # MD: 22 /UL (ref 1.4–6.5)
NEUTROPHIL PERCENT MD: 84 % (ref 34–73.5)
NITRITE URINE: NEGATIVE
PH URINE: 5 (ref 5–7)
PLATELET COUNT: 344 10*3/UL (ref 150–400)
POTASSIUM SERPL-SCNC: 4.5 MMOL/L (ref 3.5–5.3)
PROT SERPL-MCNC: 6.5 G/DL (ref 6.4–8.5)
PSA SERPL-MCNC: 1.65 NG/ML (ref 0–6.8)
RED BLOOD CELL COUNT: 4.17 10*6/UL (ref 3.9–5.7)
RED BLOOD CELLS URINE: ABNORMAL (ref 0–3)
RED CELL DISTRIBUTION WIDTH: 14.1 % (ref 11.3–14.8)
SEDIMENTATION RATE, RBC: 92 MM/H (ref 0–10)
SODIUM SERPL-SCNC: 140 MMOL/L (ref 136–146)
SPECIFIC GRAVITY URINE: 1.02 (ref 1–1.03)
SQUAMOUS EPITHELIAL CELLS: ABNORMAL
TRIGL SERPL-MCNC: 92 MG/DL (ref 0–200)
TSH SERPL DL<=0.05 MIU/L-ACNC: 1.34 M[IU]/L (ref 0.3–4.82)
URINE PROTEIN, QUAL (DIPSTICK): 100
UROBILINOGEN URINE: 2
VIT B12 SERPL-MCNC: 213 PG/ML (ref 193–982)
WBC CLUMPS: ABNORMAL
WHITE BLOOD CELL COUNT: 25.6 10*3/UL (ref 4–10)
WHITE BLOOD CELLS URINE: ABNORMAL (ref 0–5)

## 2020-04-15 PROCEDURE — 84443 ASSAY THYROID STIM HORMONE: CPT | Performed by: INTERNAL MEDICINE

## 2020-04-15 PROCEDURE — 82570 ASSAY OF URINE CREATININE: CPT | Performed by: INTERNAL MEDICINE

## 2020-04-15 PROCEDURE — 85652 RBC SED RATE AUTOMATED: CPT | Performed by: INTERNAL MEDICINE

## 2020-04-15 PROCEDURE — 83036 HEMOGLOBIN GLYCOSYLATED A1C: CPT | Performed by: INTERNAL MEDICINE

## 2020-04-15 PROCEDURE — 80061 LIPID PANEL: CPT | Performed by: INTERNAL MEDICINE

## 2020-04-15 PROCEDURE — 86225 DNA ANTIBODY NATIVE: CPT | Performed by: INTERNAL MEDICINE

## 2020-04-15 PROCEDURE — 36415 COLL VENOUS BLD VENIPUNCTURE: CPT | Performed by: INTERNAL MEDICINE

## 2020-04-15 PROCEDURE — 99215 OFFICE O/P EST HI 40 MIN: CPT | Performed by: INTERNAL MEDICINE

## 2020-04-15 PROCEDURE — 80053 COMPREHEN METABOLIC PANEL: CPT | Performed by: INTERNAL MEDICINE

## 2020-04-15 PROCEDURE — 82043 UR ALBUMIN QUANTITATIVE: CPT | Performed by: INTERNAL MEDICINE

## 2020-04-15 PROCEDURE — 81003 URINALYSIS AUTO W/O SCOPE: CPT | Performed by: INTERNAL MEDICINE

## 2020-04-15 PROCEDURE — 85007 BL SMEAR W/DIFF WBC COUNT: CPT | Performed by: INTERNAL MEDICINE

## 2020-04-15 PROCEDURE — 87086 URINE CULTURE/COLONY COUNT: CPT | Performed by: INTERNAL MEDICINE

## 2020-04-15 PROCEDURE — 71046 X-RAY EXAM CHEST 2 VIEWS: CPT | Performed by: RADIOLOGY

## 2020-04-15 PROCEDURE — 85027 COMPLETE CBC AUTOMATED: CPT | Performed by: INTERNAL MEDICINE

## 2020-04-15 PROCEDURE — 84153 ASSAY OF PSA TOTAL: CPT | Performed by: INTERNAL MEDICINE

## 2020-04-15 PROCEDURE — 82607 VITAMIN B-12: CPT | Performed by: INTERNAL MEDICINE

## 2020-04-15 PROCEDURE — 86038 ANTINUCLEAR ANTIBODIES: CPT | Performed by: INTERNAL MEDICINE

## 2020-04-15 SDOH — HEALTH STABILITY: MENTAL HEALTH: HOW OFTEN DO YOU HAVE A DRINK CONTAINING ALCOHOL?: NOT ASKED

## 2020-04-15 SDOH — HEALTH STABILITY: MENTAL HEALTH
STRESS IS WHEN SOMEONE FEELS TENSE, NERVOUS, ANXIOUS, OR CAN'T SLEEP AT NIGHT BECAUSE THEIR MIND IS TROUBLED. HOW STRESSED ARE YOU?: ONLY A LITTLE

## 2020-04-15 SDOH — HEALTH STABILITY: PHYSICAL HEALTH: ON AVERAGE, HOW MANY DAYS PER WEEK DO YOU ENGAGE IN MODERATE TO STRENUOUS EXERCISE (LIKE A BRISK WALK)?: 0 DAYS

## 2020-04-15 SDOH — HEALTH STABILITY: PHYSICAL HEALTH: ON AVERAGE, HOW MANY MINUTES DO YOU ENGAGE IN EXERCISE AT THIS LEVEL?: 0 MIN

## 2020-04-15 ASSESSMENT — ENCOUNTER SYMPTOMS
DIARRHEA: 0
SHORTNESS OF BREATH: 0
VOMITING: 0
NAUSEA: 0
EYES NEGATIVE: 1
PSYCHIATRIC NEGATIVE: 1
FEVER: 0
FATIGUE: 1
HEMATOLOGIC/LYMPHATIC NEGATIVE: 1
COUGH: 0
APPETITE CHANGE: 1
NUMBNESS: 0
WEAKNESS: 1
RESPIRATORY NEGATIVE: 1
UNEXPECTED WEIGHT CHANGE: 1
BLOOD IN STOOL: 0
BACK PAIN: 0
ABDOMINAL PAIN: 0
SORE THROAT: 0
TROUBLE SWALLOWING: 0
ACTIVITY CHANGE: 1
ALLERGIC/IMMUNOLOGIC NEGATIVE: 1

## 2020-04-15 ASSESSMENT — COGNITIVE AND FUNCTIONAL STATUS - GENERAL
DO YOU HAVE SERIOUS DIFFICULTY WALKING OR CLIMBING STAIRS: NO
BECAUSE OF A PHYSICAL, MENTAL, OR EMOTIONAL CONDITION, DO YOU HAVE SERIOUS DIFFICULTY CONCENTRATING, REMEMBERING OR MAKING DECISIONS: YES
BECAUSE OF A PHYSICAL, MENTAL, OR EMOTIONAL CONDITION, DO YOU HAVE DIFFICULTY DOING ERRANDS ALONE: NO
DO YOU HAVE DIFFICULTY DRESSING OR BATHING: NO

## 2020-04-15 ASSESSMENT — PATIENT HEALTH QUESTIONNAIRE - PHQ9
SUM OF ALL RESPONSES TO PHQ9 QUESTIONS 1 AND 2: 0
SUM OF ALL RESPONSES TO PHQ9 QUESTIONS 1 AND 2: 0
1. LITTLE INTEREST OR PLEASURE IN DOING THINGS: NOT AT ALL
2. FEELING DOWN, DEPRESSED OR HOPELESS: NOT AT ALL

## 2020-04-15 ASSESSMENT — PAIN SCALES - GENERAL: PAINLEVEL: 9-10

## 2020-04-16 ENCOUNTER — TELEPHONE (OUTPATIENT)
Dept: INTERNAL MEDICINE | Age: 80
End: 2020-04-16

## 2020-04-16 LAB — FINAL REPORT: NORMAL

## 2020-04-17 ENCOUNTER — IMAGING SERVICES (OUTPATIENT)
Dept: OTHER | Age: 80
End: 2020-04-17
Attending: INTERNAL MEDICINE

## 2020-04-17 ENCOUNTER — APPOINTMENT (OUTPATIENT)
Dept: NUCLEAR MEDICINE | Facility: HOSPITAL | Age: 80
DRG: 206 | End: 2020-04-17
Attending: PHYSICIAN ASSISTANT
Payer: MEDICARE

## 2020-04-17 ENCOUNTER — APPOINTMENT (OUTPATIENT)
Dept: GENERAL RADIOLOGY | Facility: HOSPITAL | Age: 80
DRG: 206 | End: 2020-04-17
Attending: PHYSICIAN ASSISTANT
Payer: MEDICARE

## 2020-04-17 ENCOUNTER — HOSPITAL ENCOUNTER (INPATIENT)
Facility: HOSPITAL | Age: 80
LOS: 7 days | Discharge: HOME HEALTH | DRG: 206 | End: 2020-04-25
Attending: HOSPITALIST | Admitting: HOSPITALIST
Payer: MEDICARE

## 2020-04-17 ENCOUNTER — APPOINTMENT (OUTPATIENT)
Dept: ULTRASOUND IMAGING | Facility: HOSPITAL | Age: 80
DRG: 206 | End: 2020-04-17
Attending: PHYSICIAN ASSISTANT
Payer: MEDICARE

## 2020-04-17 DIAGNOSIS — N19 RENAL FAILURE, UNSPECIFIED CHRONICITY: ICD-10-CM

## 2020-04-17 DIAGNOSIS — Z79.4 CONTROLLED TYPE 2 DIABETES MELLITUS WITHOUT COMPLICATION, WITH LONG-TERM CURRENT USE OF INSULIN (CMD): Primary | ICD-10-CM

## 2020-04-17 DIAGNOSIS — R63.4 WEIGHT LOSS: ICD-10-CM

## 2020-04-17 DIAGNOSIS — R53.1 WEAKNESS: ICD-10-CM

## 2020-04-17 DIAGNOSIS — R77.8 ELEVATED TROPONIN: ICD-10-CM

## 2020-04-17 DIAGNOSIS — D72.829 LEUKOCYTOSIS, UNSPECIFIED TYPE: ICD-10-CM

## 2020-04-17 DIAGNOSIS — E11.9 CONTROLLED TYPE 2 DIABETES MELLITUS WITHOUT COMPLICATION, WITH LONG-TERM CURRENT USE OF INSULIN (CMD): Primary | ICD-10-CM

## 2020-04-17 DIAGNOSIS — R06.00 DYSPNEA, UNSPECIFIED TYPE: Primary | ICD-10-CM

## 2020-04-17 PROBLEM — R79.89 AZOTEMIA: Status: ACTIVE | Noted: 2020-04-17

## 2020-04-17 PROBLEM — E87.1 HYPONATREMIA: Status: ACTIVE | Noted: 2020-04-17

## 2020-04-17 PROBLEM — R73.9 HYPERGLYCEMIA: Status: ACTIVE | Noted: 2020-04-17

## 2020-04-17 LAB
ANA SER QL IA: NORMAL RATIO (ref 0–0.6)
DSDNA IGG SERPL IA-ACNC: NORMAL [IU]/ML (ref 0–10)

## 2020-04-17 PROCEDURE — 93970 EXTREMITY STUDY: CPT | Performed by: PHYSICIAN ASSISTANT

## 2020-04-17 PROCEDURE — 99223 1ST HOSP IP/OBS HIGH 75: CPT | Performed by: HOSPITALIST

## 2020-04-17 PROCEDURE — 78580 LUNG PERFUSION IMAGING: CPT | Performed by: PHYSICIAN ASSISTANT

## 2020-04-17 PROCEDURE — 71045 X-RAY EXAM CHEST 1 VIEW: CPT | Performed by: PHYSICIAN ASSISTANT

## 2020-04-17 RX ORDER — TAMSULOSIN HYDROCHLORIDE 0.4 MG/1
0.4 CAPSULE ORAL DAILY
COMMUNITY

## 2020-04-17 RX ORDER — HEPARIN SODIUM 5000 [USP'U]/ML
80 INJECTION INTRAVENOUS; SUBCUTANEOUS ONCE
Status: COMPLETED | OUTPATIENT
Start: 2020-04-17 | End: 2020-04-17

## 2020-04-17 RX ORDER — LISINOPRIL 10 MG/1
10 TABLET ORAL DAILY
COMMUNITY

## 2020-04-17 RX ORDER — ACETAMINOPHEN 500 MG
1000 TABLET ORAL ONCE
Status: COMPLETED | OUTPATIENT
Start: 2020-04-17 | End: 2020-04-17

## 2020-04-17 RX ORDER — FINASTERIDE 5 MG/1
5 TABLET, FILM COATED ORAL DAILY
COMMUNITY

## 2020-04-17 RX ORDER — GLIPIZIDE 10 MG/1
10 TABLET ORAL
COMMUNITY
End: 2020-07-17

## 2020-04-17 RX ORDER — MONTELUKAST SODIUM 10 MG/1
10 TABLET ORAL NIGHTLY
COMMUNITY

## 2020-04-17 RX ORDER — OMEPRAZOLE 20 MG/1
20 CAPSULE, DELAYED RELEASE ORAL
COMMUNITY

## 2020-04-17 RX ORDER — MAGNESIUM CARB/ALUMINUM HYDROX 105-160MG
30 TABLET,CHEWABLE ORAL
COMMUNITY

## 2020-04-17 RX ORDER — HEPARIN SODIUM AND DEXTROSE 10000; 5 [USP'U]/100ML; G/100ML
18 INJECTION INTRAVENOUS ONCE
Status: COMPLETED | OUTPATIENT
Start: 2020-04-17 | End: 2020-04-18

## 2020-04-18 ENCOUNTER — APPOINTMENT (OUTPATIENT)
Dept: CV DIAGNOSTICS | Facility: HOSPITAL | Age: 80
DRG: 206 | End: 2020-04-18
Attending: HOSPITALIST
Payer: MEDICARE

## 2020-04-18 ENCOUNTER — APPOINTMENT (OUTPATIENT)
Dept: CT IMAGING | Facility: HOSPITAL | Age: 80
DRG: 206 | End: 2020-04-18
Attending: HOSPITALIST
Payer: MEDICARE

## 2020-04-18 ENCOUNTER — IMAGING SERVICES (OUTPATIENT)
Dept: OTHER | Age: 80
End: 2020-04-18
Attending: HOSPITALIST

## 2020-04-18 ENCOUNTER — APPOINTMENT (OUTPATIENT)
Dept: MRI IMAGING | Facility: HOSPITAL | Age: 80
DRG: 206 | End: 2020-04-18
Attending: INTERNAL MEDICINE
Payer: MEDICARE

## 2020-04-18 ENCOUNTER — IMAGING SERVICES (OUTPATIENT)
Dept: OTHER | Age: 80
End: 2020-04-18
Attending: INTERNAL MEDICINE

## 2020-04-18 PROBLEM — R77.8 ELEVATED TROPONIN: Status: ACTIVE | Noted: 2020-04-18

## 2020-04-18 PROBLEM — D72.829 LEUKOCYTOSIS, UNSPECIFIED TYPE: Status: ACTIVE | Noted: 2020-04-18

## 2020-04-18 PROBLEM — N19 RENAL FAILURE, UNSPECIFIED CHRONICITY: Status: ACTIVE | Noted: 2020-04-18

## 2020-04-18 PROBLEM — R06.00 DYSPNEA, UNSPECIFIED TYPE: Status: ACTIVE | Noted: 2020-04-18

## 2020-04-18 PROCEDURE — 99233 SBSQ HOSP IP/OBS HIGH 50: CPT | Performed by: HOSPITALIST

## 2020-04-18 PROCEDURE — 72157 MRI CHEST SPINE W/O & W/DYE: CPT | Performed by: INTERNAL MEDICINE

## 2020-04-18 PROCEDURE — 93306 TTE W/DOPPLER COMPLETE: CPT | Performed by: HOSPITALIST

## 2020-04-18 PROCEDURE — 72158 MRI LUMBAR SPINE W/O & W/DYE: CPT | Performed by: INTERNAL MEDICINE

## 2020-04-18 PROCEDURE — 99223 1ST HOSP IP/OBS HIGH 75: CPT | Performed by: INTERNAL MEDICINE

## 2020-04-18 PROCEDURE — 74176 CT ABD & PELVIS W/O CONTRAST: CPT | Performed by: HOSPITALIST

## 2020-04-18 PROCEDURE — 71250 CT THORAX DX C-: CPT | Performed by: HOSPITALIST

## 2020-04-18 RX ORDER — ACETAMINOPHEN 500 MG
500 TABLET ORAL ONCE
Status: COMPLETED | OUTPATIENT
Start: 2020-04-18 | End: 2020-04-18

## 2020-04-18 RX ORDER — ONDANSETRON 2 MG/ML
4 INJECTION INTRAMUSCULAR; INTRAVENOUS EVERY 4 HOURS PRN
Status: DISCONTINUED | OUTPATIENT
Start: 2020-04-18 | End: 2020-04-18

## 2020-04-18 RX ORDER — ONDANSETRON 2 MG/ML
4 INJECTION INTRAMUSCULAR; INTRAVENOUS EVERY 6 HOURS PRN
Status: DISCONTINUED | OUTPATIENT
Start: 2020-04-18 | End: 2020-04-25

## 2020-04-18 RX ORDER — HEPARIN SODIUM 5000 [USP'U]/ML
30 INJECTION, SOLUTION INTRAVENOUS; SUBCUTANEOUS ONCE
Status: COMPLETED | OUTPATIENT
Start: 2020-04-18 | End: 2020-04-18

## 2020-04-18 RX ORDER — METOCLOPRAMIDE HYDROCHLORIDE 5 MG/ML
5 INJECTION INTRAMUSCULAR; INTRAVENOUS EVERY 8 HOURS PRN
Status: DISCONTINUED | OUTPATIENT
Start: 2020-04-18 | End: 2020-04-25

## 2020-04-18 RX ORDER — CHLORAL HYDRATE 500 MG
1000 CAPSULE ORAL DAILY
COMMUNITY

## 2020-04-18 RX ORDER — HEPARIN SODIUM AND DEXTROSE 10000; 5 [USP'U]/100ML; G/100ML
INJECTION INTRAVENOUS CONTINUOUS
Status: DISCONTINUED | OUTPATIENT
Start: 2020-04-18 | End: 2020-04-25

## 2020-04-18 RX ORDER — SODIUM CHLORIDE, SODIUM LACTATE, POTASSIUM CHLORIDE, CALCIUM CHLORIDE 600; 310; 30; 20 MG/100ML; MG/100ML; MG/100ML; MG/100ML
INJECTION, SOLUTION INTRAVENOUS CONTINUOUS
Status: DISCONTINUED | OUTPATIENT
Start: 2020-04-18 | End: 2020-04-20

## 2020-04-18 RX ORDER — TAMSULOSIN HYDROCHLORIDE 0.4 MG/1
0.4 CAPSULE ORAL DAILY
Status: DISCONTINUED | OUTPATIENT
Start: 2020-04-18 | End: 2020-04-25

## 2020-04-18 RX ORDER — MONTELUKAST SODIUM 10 MG/1
10 TABLET ORAL NIGHTLY
Status: DISCONTINUED | OUTPATIENT
Start: 2020-04-18 | End: 2020-04-25

## 2020-04-18 RX ORDER — ACETAMINOPHEN 500 MG
1000 TABLET ORAL ONCE
Status: COMPLETED | OUTPATIENT
Start: 2020-04-18 | End: 2020-04-18

## 2020-04-18 RX ORDER — HYDROMORPHONE HYDROCHLORIDE 1 MG/ML
0.5 INJECTION, SOLUTION INTRAMUSCULAR; INTRAVENOUS; SUBCUTANEOUS EVERY 30 MIN PRN
Status: ACTIVE | OUTPATIENT
Start: 2020-04-18 | End: 2020-04-18

## 2020-04-18 RX ORDER — PANTOPRAZOLE SODIUM 20 MG/1
20 TABLET, DELAYED RELEASE ORAL
Status: DISCONTINUED | OUTPATIENT
Start: 2020-04-18 | End: 2020-04-25

## 2020-04-18 RX ORDER — ACETAMINOPHEN 325 MG/1
650 TABLET ORAL EVERY 6 HOURS PRN
Status: DISCONTINUED | OUTPATIENT
Start: 2020-04-18 | End: 2020-04-25

## 2020-04-18 RX ORDER — DEXTROSE MONOHYDRATE 25 G/50ML
50 INJECTION, SOLUTION INTRAVENOUS
Status: DISCONTINUED | OUTPATIENT
Start: 2020-04-18 | End: 2020-04-25

## 2020-04-18 RX ORDER — FINASTERIDE 5 MG/1
5 TABLET, FILM COATED ORAL DAILY
Status: DISCONTINUED | OUTPATIENT
Start: 2020-04-18 | End: 2020-04-25

## 2020-04-18 NOTE — ED INITIAL ASSESSMENT (HPI)
Patient presents to the ER with a mask on. Patient states that he received a call from his doctor and was told to come to the ER for an elevated white blood cell count.  Patient states \"I am here so you can tell me what is wrong, I don't know what is wrong

## 2020-04-18 NOTE — ED PROVIDER NOTES
Patient Seen in: [facility] Emergency Department      History   Patient presents with:  Fatigue  Abnormal Result    Stated Complaint: abnormal wbc, no appetite, fatigue    HPI    70-year-old male with a known history of diabetes, reflux, hypertensio HENT:      Head: Normocephalic and atraumatic. Mouth/Throat:      Mouth: Mucous membranes are moist.      Pharynx: No oropharyngeal exudate.    Eyes:      Conjunctiva/sclera: Conjunctivae normal.      Pupils: Pupils are equal, round, and reactive to Osmolality 305 (*)     GFR, Non- 26 (*)     GFR, -American 30 (*)     AST 57 (*)     Albumin 1.9 (*)     Globulin  5.3 (*)     A/G Ratio 0.4 (*)     All other components within normal limits   TROPONIN I - Abnormal; Notable for the f on the individual orders. URINALYSIS WITH CULTURE REFLEX   RAINBOW DRAW BLUE   RAINBOW DRAW LIGHT GREEN   RAINBOW DRAW GOLD   BLOOD CULTURE   BLOOD CULTURE     EKG    Rate, intervals and axes as noted on EKG Report.   Rate: 101  Rhythm: Sinus Rhythm  Read Heparin drip will be started. Patient to be admitted. Patient was placed on 2 L as he was desaturating to 90 to 93% with conversational dyspnea.   D-dimer elevated over 4      Discussed with [facility] hospitalist who agrees with admission and plan heparin to

## 2020-04-18 NOTE — CONSULTS
INFECTIOUS DISEASE CONSULT NOTE (TELEMEDICINE NOTE)    Jackie Rabago Patient Status:  Inpatient    1940 MRN [id number]   [facility] 3NW-A Attending Sharonda Kenney MD   [facility] mL, 50 mL, Intravenous, Q15 Min PRN **OR** glucose (DEX4) oral liquid 30 g, 30 g, Oral, Q15 Min PRN **OR** Glucose-Vitamin C (DEX-4) chewable tab 8 tablet, 8 tablet, Oral, Q15 Min PRN  •  insulin detemir (LEVEMIR) 100 UNIT/ML flextouch 10 Units, 10 Units, hypoxia- no longer on supplemental oxygen    4.  Significant ferritin elevation    PLAN:    -Repeat COVID-19 testing to better exclude infection  -Anticoagulation per primary service  -Follow CBC, renal function, and inflammatory markers  -Discussed with fl

## 2020-04-18 NOTE — PLAN OF CARE
Patient has temp of 101.8F, , BP of 130/60. Sepsis BPA. Dr. Theresa llamas.  Ordered 1000mg Tylenol, Stat CT of Chest, Abdomen and Pelvis and IV Zosyn    ID was notified, no additional orders

## 2020-04-18 NOTE — PLAN OF CARE
NURSING ADMISSION NOTE    Pt admitted from ED to room 309. Patient admitted via Cart. Oriented to room. Safety precautions initiated. Bed in low position. Call light in reach. Admission navigator completed. Pt a/oxZENIA De La Garza. Denies pain. VSS.  Guillermo Cooper

## 2020-04-18 NOTE — CONSULTS
Hematology/Oncology Initial Consultation Note    Patient Name: Winston Khan  Medical Record Number: [id number]    YOB: 1940   Date of Consultation: 4/18/2020   Physician requesting consultation: Dr. Yudith Estevez    Reason for Consultation for age (36 to 50% plasmacytosis (plasma cell count 11% consistent with involvement by plasma cell dyscrasia. Trilineage hematopoiesis with relative erythroid hyperplasia, myeloid to erythroid ratio 1:1). Congo red stain is negative for amyloid.   Marlene Landrum plt 213, cre 2.0  -4/15/20- wbc 25.6, ANC 22K, AMC 2300, hgb 12.7, plt 344, cre 2.5, DSDNA negative, B12 213, ESR 92, TSH 1.3, PSA 1.3    =============================================  History of Present Illness: 16-year-old male with history of smoldering MG Oral Tab, Take 10 mg by mouth daily. , Disp: , Rfl:   Montelukast Sodium 10 MG Oral Tab, Take 10 mg by mouth nightly., Disp: , Rfl:   omeprazole 20 MG Oral Capsule Delayed Release, Take 20 mg by mouth every morning before breakfast., Disp: , Rfl:   abdullahi --  Pulse: 82 (04/18 0805)  BP: 111/57 (04/18 0805)  Temp: 98 °F (36.7 °C) (04/18 0805)  Do Not Use - Resp Rate: --  SpO2: 96 % (04/18 0805)    Wt Readings from Last 6 Encounters:  04/17/20 : 83.9 kg (185 lb)    Physical Examination:  General: Patient is a -ID consult. Should rule out epidural or spinal abscess given neurologic changes- discussed with Dr. Rohith Adams, rec'd MRI with and w/o contrast, given creCl is 30 is okay to proceed. *smoldering myeloma- light chain disease  -not on treatment.   Glenda Delatorre

## 2020-04-18 NOTE — PLAN OF CARE
Assumed care of patient at 0700  A/Ox4, RA, NSR on tele  Heme/Onc on consult, ordered labs and MRI of spine to rule out spinal abscess  MRI Screening completed  Heparin gtt increased by 150 units per protocol with a bolus of 2500 units of heparin to IV.  He management of diabetes  Outcome: Progressing     Problem: HEMATOLOGIC - ADULT  Goal: Free from bleeding injury  Description  (Example usage: patient with low platelets)  INTERVENTIONS:  - Avoid intramuscular injections, enemas and rectal medication adminis

## 2020-04-18 NOTE — RESPIRATORY THERAPY NOTE
Worn a PPE (gron, gloves, eye protective, N95 mask, cap ) during a nasal swab, pt tolerated well, the specimen sent to the lab by walked.

## 2020-04-18 NOTE — ED NOTES
Called to give report per RN on floor they cannot take patient to unit. Nursing supervisor notified. Per RN supervisor she will reassign room. Pt currently in [facility]

## 2020-04-18 NOTE — ED PROVIDER NOTES
I reviewed that chart and discussed the case. I have examined the patient and noted 24-year-old male who presents here to the emerge department complaint of having shortness of breath, generalized weakness and fatigue.   Patient says that he has been notic

## 2020-04-18 NOTE — PROGRESS NOTES
JANIE HOSPITALIST  Progress Note     [facility] Patient Status:  Inpatient    1940 MRN [id number]   [facility] 3NW-A Attending Aisha Rodriguez MD   Hosp Day # 0 PCP ALICIA [address]     Chief Complaint: SOB  S:  Patient denies tio and HS     ASSESSMENT / PLAN:   1. Dyspnea/Hypoxia- improved. Not convinced its from PE though elevated D Dimer  1. Rapid COVID neg- No symptoms of COVID, will consult ID for recommendations  2.  V/Q unremarkable- US with Chronic b/l  common and superficial

## 2020-04-18 NOTE — H&P
JANIE HOSPITALIST  History and Physical     Dirk Briceño Patient Status:  Emergency    1940 MRN [id number]   Location 6[address] [facility] Attending No att. providers found   [facility] Day # 0 PCP [address]     Chief Complaint: Capsule Delayed Release, Take 20 mg by mouth every morning before breakfast., Disp: , Rfl:   tamsulosin (FLOMAX) cap, Take 0.4 mg by mouth daily. , Disp: , Rfl:   finasteride 5 MG Oral Tab, Take 5 mg by mouth daily. , Disp: , Rfl:         Review of Systems: Chronic b/l  common and superficial femoral veins and acute vs subacute superficial thrombus in b/l saphenous veins; denies known hx of thrombosis  4. Likely PE  5. Check echo  6. Unable to do CTA with MARILUZ/CKD  7.  No symptoms of COVID, would hold on retest

## 2020-04-19 PROCEDURE — 99233 SBSQ HOSP IP/OBS HIGH 50: CPT | Performed by: HOSPITALIST

## 2020-04-19 PROCEDURE — 99232 SBSQ HOSP IP/OBS MODERATE 35: CPT | Performed by: INTERNAL MEDICINE

## 2020-04-19 RX ORDER — HEPARIN SODIUM 5000 [USP'U]/ML
30 INJECTION, SOLUTION INTRAVENOUS; SUBCUTANEOUS ONCE
Status: COMPLETED | OUTPATIENT
Start: 2020-04-19 | End: 2020-04-19

## 2020-04-19 NOTE — PROGRESS NOTES
JANIE HOSPITALIST  Progress Note     Mono Gomez Patient Status:  Inpatient    1940 MRN [id number]   [facility] 3NW-A Attending Harvey Hylton MD   Hosp Day # 1 PCP [address]     Chief Complaint: SOB  S:  Patient denies tio Montelukast Sodium  10 mg Oral Nightly   • Pantoprazole Sodium  20 mg Oral QAM AC   • tamsulosin HCl  0.4 mg Oral Daily   • insulin detemir  10 Units Subcutaneous Nightly   • Insulin Aspart Pen  1-10 Units Subcutaneous TID AC and HS   • piperacillin-tazoba MD

## 2020-04-19 NOTE — PROGRESS NOTES
Hematology/Oncology Progress Note    Patient Name: Kortney Schultz  Medical Record Number: [id number]    YOB: 1940     Reason for Consultation:  Kortney Schultz was seen today for the diagnosis of DVT    Interval events: Patient feels his leg 14.1, plt 213, cre 2.0  -4/15/20- wbc 25.6, ANC 22K, AMC 2300, hgb 12.7, plt 344, cre 2.5, DSDNA negative, B12 213, ESR 92, TSH 1.3, PSA 1.3    Recent Labs   Lab 04/17/20  1933 04/18/20  0640 04/19/20  0556   WBC 27.0* 24.9* 22.8*   HGB 12.3* 11.8* 11.1* Color Urine      Yellow Yellow   CLARITY URINE      Clear Hazy (A)   Spec Gravity      1.001 - 1.030 1.024   Glucose Urine      Negative mg/dl Negative   Bilirubin      Negative Negative   Ketones, UA      Negative mg/dL Negative   Blood Urine      Negat partially decompressed. No biliary ductal dilatation. PANCREAS:  Unremarkable. SPLEEN:  Normal caliber   AORTA/VASCULAR:  Mild aortoiliac calcification. .  No aneurysm. RETROPERITONEUM:  No enlarged adenopathy.     BOWEL/MESENTERY:  Normal caliber PE on V/Q scan.    -reasonable to continue UFH gtt for now while evaluating further since the chronicity is uncertain. *Bilateral hip flexor weakness, low grade fever, elevated inflammatory markers  -concern for infection.   Initial SARS-CoV-2 negative,

## 2020-04-19 NOTE — PROGRESS NOTES
INFECTIOUS DISEASE PROGRESS NOTE (TELEMEDICINE NOTE)    Radha Wallace Patient Status:  Inpatient    1940 MRN [id number]   [facility] 3NW-A Attending Dariel Hernandez MD   [place] of systems was completed. Pertinent positives and negatives noted in the the HPI.     Physical Exam:    Physical examination deferred in setting of COVID-19 pandemic; please see hospitalist notes    Vital signs: Blood pressure 101/65, pulse 87, temperature renal function, and inflammatory markers  -Continue Zosyn for now  -Hematology service following for DVTs and history of smoldering myeloma    Andrea Schneider MD  [address] [place] [address]

## 2020-04-19 NOTE — PLAN OF CARE
Assumed care at [phone] and oriented x4  [facility] with b/l hearing aids, impaired vision with glasses at bedside  Currently on RA, spO2 90's, no cough or SOB noted  Currently afebrile  Heparin gtt, LR, and zosyn infusing  MRI thoracic/lumbar spine completed unsuccessful or patient reports new pain  - Anticipate increased pain with activity and pre-medicate as appropriate  Outcome: Progressing     Problem: RISK FOR INFECTION - ADULT  Goal: Absence of fever/infection during anticipated neutropenic period  Descr pressure on venipuncture sites to achieve adequate hemostasis  - Assess for signs and symptoms of internal bleeding  - Monitor lab trends  - Patient is to report abnormal signs of bleeding to staff  - Avoid use of toothpicks and dental floss  - Use electri

## 2020-04-20 ENCOUNTER — TELEPHONE (OUTPATIENT)
Dept: INTERNAL MEDICINE | Age: 80
End: 2020-04-20

## 2020-04-20 PROCEDURE — 99232 SBSQ HOSP IP/OBS MODERATE 35: CPT | Performed by: INTERNAL MEDICINE

## 2020-04-20 PROCEDURE — 99233 SBSQ HOSP IP/OBS HIGH 50: CPT | Performed by: HOSPITALIST

## 2020-04-20 RX ORDER — SODIUM CHLORIDE 9 MG/ML
INJECTION, SOLUTION INTRAVENOUS CONTINUOUS
Status: DISCONTINUED | OUTPATIENT
Start: 2020-04-20 | End: 2020-04-25

## 2020-04-20 NOTE — PLAN OF CARE
Assumed care of patient at 0730. Patient alert and oriented. Vital signs stable. Sinus rhythm on telemetry. Lungs clear on auscultation. LR infusing per order. Heparin gtt maintained per order. Patient with fever of 101.5 at 1653. Tylenol administered. Assess pain using appropriate pain scale  - Administer analgesics based on type and severity of pain and evaluate response  - Implement non-pharmacological measures as appropriate and evaluate response  - Consider cultural and social influences on pain and restriction as ordered  - Instruct patient on fluid and nutrition restrictions as appropriate  4/19/2020 2016 by Ciarra Garza RN  Outcome: Progressing  4/19/2020 1150 by Ciarra Garza RN  Outcome: Progressing     Problem: HEMATOLOGIC - ADULT

## 2020-04-20 NOTE — PLAN OF CARE
A/OX4, RA, SR per Tele  Tmax-100.6, tylenol given, reassessment 97.8  Denies pain at this time  Heparin gtt infusing per protocol   Needs attended to, Will cont to monitor.         Problem: RISK FOR INFECTION - ADULT  Goal: Absence of fever/infection during

## 2020-04-20 NOTE — PROGRESS NOTES
JANIE HOSPITALIST  Progress Note     Kortney Schultz Patient Status:  Inpatient    1940 MRN [id number]   [facility] 3NW-A Attending Fernando Phillips MD   Hosp Day # 2 PCP ALICIA DUNAWAY     Chief Complaint: SOB    S:  Patient had feve 04/17/20  1933 04/18/20  0221 04/18/20  0640   TROP 0.299* 0.177* 0.142*   CK 66  --   --         Imaging: Imaging data reviewed in Epic.   Medications:   • finasteride  5 mg Oral Daily   • Montelukast Sodium  10 mg Oral Nightly   • Pantoprazole Sodium  20 drip  · CODE status: full  · Rayo: no  Estimated date of discharge: TBD  Case d/w RN, pt, MD.     Jessica MUNGUIA  1:17 PM     Addendum  Patient seen and examined  Chest: CTA B/L  CVS: S1, S2, RRR  ABD: Soft, NT, ND, BS+  EXT: No c/c    Imaging: Review

## 2020-04-20 NOTE — PROGRESS NOTES
Hematology/Oncology Progress Note    Patient Name: Rimma Rosen  Medical Record Number: [id number]    YOB: 1940     Reason for Consultation:  Rimma Rosen was seen today for the diagnosis of DVT    Interval events: Leg strength is sharda 2.5, DSDNA negative, B12 213, ESR 92, TSH 1.3, PSA 1.3    Recent Labs   Lab 04/17/20  1933 04/18/20  0640 04/19/20  0556 04/20/20  0636   WBC 27.0* 24.9* 22.8* 23.7*   HGB 12.3* 11.8* 11.1* 11.3*   HCT 36.9* 34.9* 33.0* 33.5*   .0 379.0 400.0 435.0 (L)     Component      Latest Ref Rng & Units 4/19/2020   Color Urine      Yellow Yellow   CLARITY URINE      Clear Hazy (A)   Spec Gravity      1.001 - 1.030 1.024   Glucose Urine      Negative mg/dl Negative   Bilirubin      Negative Negative   Ketones, hydronephrosis, nephrolithiasis or obstructing urinary calculus. There is moderate perinephric stranding about both kidneys which may be seen with pyelonephritis. 2 cm low-attenuation mass midpole left kidney. ADRENALS:  Normal.  No mass or enlargement. spine.  Specifically no epidural abscess appreciated    Impression & Plan:     *bilateral femoral DVT, appears chronic in nature + acute superficial thrombosis of saphenous veins  -unlikely the cause of his current/presenting symptoms of fatigue, fever and

## 2020-04-20 NOTE — PLAN OF CARE
Assumed care at 0730. A&OX4. Talkative and cooperative. No temps in am, but 101.5 temp at 1600. Tylenol given. Dr. Nakita Webber notified of temp. Pt up in chair periodically today. Antibiotics given. Isolation DC'd. Weakness in hips improving.   Resting patient reports new pain  - Anticipate increased pain with activity and pre-medicate as appropriate  Outcome: Progressing     Problem: METABOLIC/FLUID AND ELECTROLYTES - ADULT  Goal: Glucose maintained within prescribed range  Description  INTERVENTIONS: shaving  - Use soft bristle tooth brush  - Limit straining and forceful nose blowing  Outcome: Progressing     Problem: RISK FOR INFECTION - ADULT  Goal: Absence of fever/infection during anticipated neutropenic period  Description  INTERVENTIONS  - Monito

## 2020-04-21 PROCEDURE — 99232 SBSQ HOSP IP/OBS MODERATE 35: CPT | Performed by: INTERNAL MEDICINE

## 2020-04-21 PROCEDURE — 99233 SBSQ HOSP IP/OBS HIGH 50: CPT | Performed by: HOSPITALIST

## 2020-04-21 NOTE — PROGRESS NOTES
[facility]                INFECTIOUS DISEASE PROGRESS NOTE    Adrián Snow Patient Status:  Inpatient    1940 MRN [id number]   [facility] 3NW-A Attending Kashif Phelps MD   Hosp Day # 3 PCP ALICIA DUNAWAY     Antibiotics: CREATSERUM 2.33* 2.01* 1.78* 1.71*   GFRAA 30* 36* 41* 43*   GFRNAA 26* 31* 36* 38*   CA 8.4* 8.3* 8.1* 7.9*   ALB 1.9*  --  1.4*  --    * 139 140 139   K 4.6 4.0 4.1 4.0    109 110 109   CO2 22.0 23.0 23.0 23.0   ALKPHO 110  --  96  --    AS

## 2020-04-21 NOTE — PLAN OF CARE
Pt to be transferred to room 3601. Verbal report called to RN. Pt updated on Plan of Care and verbalizes understanding.

## 2020-04-21 NOTE — PROGRESS NOTES
JANIE HOSPITALIST  Progress Note     Opal Silverman Patient Status:  Inpatient    1940 MRN [id number]   [facility] 3NW-A Attending Felice Shankar MD   Hosp Day # 3 PCP [address]     Chief Complaint: SOB    S:  Patient reports input(s): PTP, INR in the last 168 hours. Recent Labs   Lab 04/17/20  1933 04/18/20  0221 04/18/20  0640   TROP 0.299* 0.177* 0.142*   CK 66  --   --         Imaging: Imaging data reviewed in Epic.   Medications:   • Insulin Aspart Pen  1-15 Units Subcutan diameter- outpt f/u     Quality:  · DVT Prophylaxis: heparin drip  · CODE status: full  · Rayo: no  Estimated date of discharge: TBD    Case d/w RN, pt.  RN reports no contrast until Friday for indium scan - will f/u on ID recs.       Phyllis MUNGUIA  1

## 2020-04-21 NOTE — PROGRESS NOTES
Pt. Slept well overnight. Moved to chair around 0515. Complaints if back pain, PRN tylenol if needed. VSS overnight-afebrile. RA. . Tele-NSR. Abdomen is soft and rounded. Voids in urinal. Up with standby assist. Heparin drip infusing at 2100 units/hr.  I

## 2020-04-21 NOTE — PHYSICAL THERAPY NOTE
PHYSICAL THERAPY EVALUATION - INPATIENT     Room Number: 154/622-I  Evaluation Date: 4/21/2020  Type of Evaluation: Initial  Physician Order: PT Eval and Treat    Presenting Problem: chronic B DVT, leukocytosis  Reason for Therapy: Mobility Dysfuncti Patient self-stated goal is to get stronger and return home.      OBJECTIVE  Precautions: Hard of hearing  Fall Risk: High fall risk    WEIGHT BEARING RESTRICTION  Weight Bearing Restriction: None                PAIN ASSESSMENT  Ratin          COGNI assistance  Distance (ft): 20  Assistive Device: Rolling walker  Pattern: R Foot flat;L Foot flat          Skilled Therapy Provided: (PPE worn: mask, gloves) Pt received supine in bed and is agreeable to therapy. Pt supine-sit with supervision.  Pt demonstr complexity is considered moderate. These impairments and comorbidities manifest themselves as functional limitations in independent bed mobility, transfers, and gait.  The patient is below baseline and would benefit from skilled inpatient PT to address the

## 2020-04-21 NOTE — PROGRESS NOTES
Hematology/Oncology Progress Note    Patient Name: Denver Mixer  Medical Record Number: [id number]    YOB: 1940     Reason for Consultation:  Denver Mixer was seen today for the diagnosis of DVT    Interval events: Leg strength is about wbc 11.0, ANC 7.9K, , hgb 14.1, plt 213, cre 2.0  -4/15/20- wbc 25.6, ANC 22K, AMC 2300, hgb 12.7, plt 344, cre 2.5, DSDNA negative, B12 213, ESR 92, TSH 1.3, PSA 1.3    Recent Labs   Lab 04/17/20  1933  04/19/20  0556 04/20/20  0636 04/21/20  0549 1/10/19 4.21mcg/mL  Component      Latest Ref Rng & Units 4/19/2020   IMMUNOGLOBULIN A      70.00 - 312.00 mg/dL 229.00   IMMUNOGLOBULIN G      791-1,643 mg/dL 961   IMMUNOGLOBULIN M      43.0 - 279.0 mg/dL 31.6 (L)     Component      Latest Ref Rng & Unit hilar adenopathy. There is a 1.8 cm nodule in the right lobe of the thyroid gland. Eber Slidell CARDIAC:  No significant pericardial effusion. PLEURA:  No pneumothorax or effusion.      ABDOMEN/PELVIS:  KIDNEYS:  No hydronephrosis, nephrolithiasis or obstructing u superimposed central and right paracentral broad-based disc protrusion. There is mild bilateral neural foraminal narrowing. No central canal stenosis. No abnormal enhancement involving the thoracic or lumbar spine.   Specifically no epidural abscess appre [facility]

## 2020-04-21 NOTE — PLAN OF CARE
Pt is AOx4, denies chest pain or dyspnea. Room air. SR on tele. Up with assist of 1. Voids in the urinal.  Heparin gtt infusing, NS infusing. QID accucheck. Hard of hearing. Will monitor.

## 2020-04-22 ENCOUNTER — TELEPHONE (OUTPATIENT)
Dept: INTERNAL MEDICINE | Age: 80
End: 2020-04-22

## 2020-04-22 PROCEDURE — 99232 SBSQ HOSP IP/OBS MODERATE 35: CPT | Performed by: HOSPITALIST

## 2020-04-22 PROCEDURE — 99232 SBSQ HOSP IP/OBS MODERATE 35: CPT | Performed by: INTERNAL MEDICINE

## 2020-04-22 NOTE — PROGRESS NOTES
Hematology/Oncology Progress Note    Patient Name: Ric Kincaid  Medical Record Number: [id number]    YOB: 1940     Reason for Consultation:  Ric Kincaid was seen today for the diagnosis of DVT    Interval events: Leg strength is sharda 1.79  -4/1/20- wbc 11.0, ANC 7.9K, , hgb 14.1, plt 213, cre 2.0  -4/15/20- wbc 25.6, ANC 22K, AMC 2300, hgb 12.7, plt 344, cre 2.5, DSDNA negative, B12 213, ESR 92, TSH 1.3, PSA 1.3    Recent Labs   Lab 04/17/20  1933  04/19/20  0556 04/20/20  0636 04/19/2020   B2M 1/10/19 4.21mcg/mL  Component      Latest Ref Rng & Units 4/19/2020   IMMUNOGLOBULIN A      70.00 - 312.00 mg/dL 229.00   IMMUNOGLOBULIN G      791-1,643 mg/dL 961   IMMUNOGLOBULIN M      43.0 - 279.0 mg/dL 31.6 (L)     Component      Late enlarged mediastinal or hilar adenopathy. There is a 1.8 cm nodule in the right lobe of the thyroid gland. Ellen Band CARDIAC:  No significant pericardial effusion. PLEURA:  No pneumothorax or effusion.      ABDOMEN/PELVIS:  KIDNEYS:  No hydronephrosis, nephroli diffuse disc bulge with superimposed central and right paracentral broad-based disc protrusion. There is mild bilateral neural foraminal narrowing. No central canal stenosis. No abnormal enhancement involving the thoracic or lumbar spine.   Specifically n MD  Hematology/Medical [address] [place] [address]

## 2020-04-22 NOTE — HOME CARE LIAISON
Received referral from [address] [place] [address]. Called patient to discuss home health services and offer choice. Patient is agreeable to [facility] services at discharge. Any questions addressed. Will follow.

## 2020-04-22 NOTE — PLAN OF CARE
Received transfer pt @ 1915. Patient is A&O x4.  Calm and cooperative. [place]-bilateral HA at bedside. VSS. On tele-NSR. On RA. IV-0.9% NS 75 ml/hr. IV abx. Heparin gtt 19.5 ml/hr. Redraw PTT in AM.  C/o back pain, tylenol prn given. Accuchecks.    Wa Manage/alleviate anxiety  - Utilize distraction and/or relaxation techniques  - Monitor for opioid side effects  - Notify MD/LIP if interventions unsuccessful or patient reports new pain  - Anticipate increased pain with activity and pre-medicate as approp platelets)  INTERVENTIONS:  - Avoid intramuscular injections, enemas and rectal medication administration  - Ensure safe mobilization of patient  - Hold pressure on venipuncture sites to achieve adequate hemostasis  - Assess for signs and symptoms of inter

## 2020-04-22 NOTE — CM/SW NOTE
04/22/20 1000   CM/SW Referral Data   Referral Source Social Work (self-referral)   Reason for Referral Discharge planning   Informant Patient   Patient Info   Patient's Mental Status Alert;Oriented   Patient Communication Issues Hearing impairment   Pa

## 2020-04-22 NOTE — PROGRESS NOTES
JANIE HOSPITALIST  Progress Note     Chelsea [facility] Patient Status:  Inpatient    1940 MRN [id number]   [facility] 3NW-A Attending Dr. Daniel Robles Day # 4 PCP [address]     Chief Complaint: SOB    S:  Patient joking and aski in the last 168 hours. Recent Labs   Lab 04/17/20  1933 04/18/20  0221 04/18/20  0640   TROP 0.299* 0.177* 0.142*   CK 66  --   --         Imaging: Imaging data reviewed in Epic.   Medications:   • Insulin Aspart Pen  1-15 Units Subcutaneous TID CC and HS mm average diameter- outpt f/u     Quality:  · DVT Prophylaxis: heparin drip  · CODE status: full  · Rayo: no  Estimated date of discharge: TBD    Case d/w RN, pt. Await indium scan.        Kyle MUNGUIA  11:50 AM     Hospitalist Addendum    S: Vonne Serve

## 2020-04-22 NOTE — PLAN OF CARE
Pt A&Ox4. ZENIA harvey HA at bedside. RA. NSR on tele. Using the urinal. Up to chair with assist. Pt legs feeling weak.  Pt states pain is fine this am. Heparin gtt at 19.5cc, therapeutic this am will recheck PTT in am. Will remain on gtt and plan to discharge pain management  - Manage/alleviate anxiety  - Utilize distraction and/or relaxation techniques  - Monitor for opioid side effects  - Notify MD/LIP if interventions unsuccessful or patient reports new pain  - Anticipate increased pain with activity and pre

## 2020-04-23 ENCOUNTER — IMAGING SERVICES (OUTPATIENT)
Dept: OTHER | Age: 80
End: 2020-04-23
Attending: INTERNAL MEDICINE

## 2020-04-23 ENCOUNTER — APPOINTMENT (OUTPATIENT)
Dept: CT IMAGING | Facility: HOSPITAL | Age: 80
DRG: 206 | End: 2020-04-23
Attending: INTERNAL MEDICINE
Payer: MEDICARE

## 2020-04-23 PROCEDURE — 74176 CT ABD & PELVIS W/O CONTRAST: CPT | Performed by: INTERNAL MEDICINE

## 2020-04-23 PROCEDURE — 99232 SBSQ HOSP IP/OBS MODERATE 35: CPT | Performed by: INTERNAL MEDICINE

## 2020-04-23 PROCEDURE — 99232 SBSQ HOSP IP/OBS MODERATE 35: CPT | Performed by: HOSPITALIST

## 2020-04-23 NOTE — PROGRESS NOTES
JANIE HOSPITALIST  Progress Note     Tomabilio Bryantlo Patient Status:  Inpatient    1940 MRN [id number]   [facility] 3NW-A Attending Dr. Misty Riddle [facility]     Chief Complaint: SOB    S:  Patient joking and no c (H)).  No results for input(s): PTP, INR in the last 168 hours. Recent Labs   Lab 04/17/20  1933 04/18/20  0221 04/18/20  0640   TROP 0.299* 0.177* 0.142*   CK 66  --   --         Imaging: Imaging data reviewed in Epic.   Medications:   • Insulin Aspart Pe 15. Incidental newly detected solid pulmonary nodule of 6.1-8 mm average diameter- outpt f/u     Quality:  · DVT Prophylaxis: heparin drip  · CODE status: full  · Rayo: no  Estimated date of discharge: TBD    Case d/w RN, pt. ID. Await indium scan.

## 2020-04-23 NOTE — CM/SW NOTE
Care Progression Note:  Active Acute Medical Issue:     Persistent leukocytosis- cause unknown:  -WBC up today 22.7 from 21.0  -work up neg. awaiting Indium scan (isotope not available until Saturday).   -fevers resolved    Bilat femoral DVT:  - Hep gtt  -E

## 2020-04-23 NOTE — CM/SW NOTE
Per Ute Florez, pt does not have coverage for Eliquis, his monthly copay would be > $500. [facility]. Pt's nurse states Dr. Quiles  office will provide the Eliquis after the first month.   Pt

## 2020-04-23 NOTE — PLAN OF CARE
Resumed care of pt. At [address] [facility]. Pt. Is Ax4, [place]---with hearing aids in    Heparin drip at 19.5cc/hr---PTT in am  Empiric Zosyn    Accu: QID   Imdium Scan pending    Tylenol given via MAR  PIV:.9/75    Pt.  Remained Afebrile overnight     PT recomme pain  - Anticipate increased pain with activity and pre-medicate as appropriate  Outcome: Progressing     Problem: RISK FOR INFECTION - ADULT  Goal: Absence of fever/infection during anticipated neutropenic period  Description  INTERVENTIONS  - Monitor WBC achieve adequate hemostasis  - Assess for signs and symptoms of internal bleeding  - Monitor lab trends  - Patient is to report abnormal signs of bleeding to staff  - Avoid use of toothpicks and dental floss  - Use electric shaver for shaving  - Use soft b

## 2020-04-23 NOTE — CM/SW NOTE
MSW consulted by MD to call Pt's Dtr. MSW called pt's Dtr  Gregory Sims- [phone]. MSW discussed pt getting a caregiver vs Dtr Berenice Gutierrez coming out to stay with patient. We discussed her getting groceries delivered to patient's home.  MSW also emailed care

## 2020-04-23 NOTE — DIETARY NOTE
[address]     Admitting diagnosis:  Elevated troponin [R79.89]  Leukocytosis, unspecified type [D72.829]  Dyspnea, unspecified type [R06.00]  Renal failure, unspecified chronicity [N19]    Ht: 172.7 cm (5' 8\")

## 2020-04-23 NOTE — PHYSICAL THERAPY NOTE
PHYSICAL THERAPY TREATMENT NOTE - INPATIENT    Room Number: 8787/8568-R     Session: 1   Number of Visits to Meet Established Goals: 5    Presenting Problem: chronic B DVT, leukocytosis    History related to current admission: Pt is a 68 y.o. male admitte Poor +  Dynamic Standing: Poor +    ACTIVITY TOLERANCE                         O2 WALK                    AM-PAC '6-Clicks' INPATIENT SHORT FORM - BASIC MOBILITY  How much difficulty does the patient currently have. ..  -   Turning over in bed (including ad strength, balance and decreased endurance below PLOF. Will continue to follow to progress while in house. The -PAC '6-Clicks' Inpatient Basic Mobility Short Form was completed and this patient is demonstrating a 41.77% degree of impairment in mobility.

## 2020-04-23 NOTE — PROGRESS NOTES
[facility]                INFECTIOUS DISEASE PROGRESS NOTE    Maia Hauser Patient Status:  Inpatient    1940 MRN [id number]   [facility] 3NW-A Attending Hilario Evans MD   Hosp Day # 5 PCP ALICIA DUNAWAY     Antibiotics: 38* 44*   CA 8.4*   < > 8.1* 7.9* 7.7*   ALB 1.9*  --  1.4*  --   --    *   < > 140 139 140   K 4.6   < > 4.1 4.0 3.5      < > 110 109 111   CO2 22.0   < > 23.0 23.0 24.0   ALKPHO 110  --  96  --   --    AST 57*  --  21  --   --    ALT 44  --

## 2020-04-23 NOTE — PLAN OF CARE
Patient alert and oriented x4. [place]. On Ra, . NSR on tele. IV abx. Heparin gtt. Ptt redraw at 1300. Blood sugars maintained as per MAR. Resting comfortably in bed. Will continue to monitor.

## 2020-04-23 NOTE — PROGRESS NOTES
Hematology/Oncology Progress Note    Patient Name: Radhames Hodges  Medical Record Number: [id number]    YOB: 1940     Reason for Consultation:  Radhames Hodges was seen today for the diagnosis of DVT    Interval events: no fevers since eveni 1.3    Recent Labs   Lab 04/19/20  0556 04/20/20  0636 04/21/20  0549 04/23/20  0545   WBC 22.8* 23.7* 21.0* 22.7*   HGB 11.1* 11.3* 11.2* 10.4*   HCT 33.0* 33.5* 33.7* 31.8*   .0 435.0 323.0 485.0*   MCV 89.2 90.8 90.3 90.9   RDW 14.1 14.1 14.4 14. IMMUNOGLOBULIN G      791-1,643 mg/dL 961   IMMUNOGLOBULIN M      43.0 - 279.0 mg/dL 31.6 (L)     Component      Latest Ref Rng & Units 4/19/2020   Color Urine      Yellow Yellow   CLARITY URINE      Clear Hazy (A)   Spec Gravity      1.001 - 1.030 1.024 pericardial effusion. PLEURA:  No pneumothorax or effusion. ABDOMEN/PELVIS:  KIDNEYS:  No hydronephrosis, nephrolithiasis or obstructing urinary calculus.   There is moderate perinephric stranding about both kidneys which may be seen with pyelonephriti narrowing. No central canal stenosis. No abnormal enhancement involving the thoracic or lumbar spine.   Specifically no epidural abscess appreciated    Impression & Plan:     *bilateral femoral DVT, appears chronic in nature + acute superficial thrombosis

## 2020-04-24 ENCOUNTER — APPOINTMENT (OUTPATIENT)
Dept: NUCLEAR MEDICINE | Facility: HOSPITAL | Age: 80
DRG: 206 | End: 2020-04-24
Attending: INTERNAL MEDICINE
Payer: MEDICARE

## 2020-04-24 ENCOUNTER — IMAGING SERVICES (OUTPATIENT)
Dept: OTHER | Age: 80
End: 2020-04-24
Attending: INTERNAL MEDICINE

## 2020-04-24 PROCEDURE — 78806 NM INFECTION INDIUM WBC 111 COMPLETE  (CPT=78806): CPT | Performed by: INTERNAL MEDICINE

## 2020-04-24 PROCEDURE — 99232 SBSQ HOSP IP/OBS MODERATE 35: CPT | Performed by: INTERNAL MEDICINE

## 2020-04-24 PROCEDURE — 99232 SBSQ HOSP IP/OBS MODERATE 35: CPT | Performed by: HOSPITALIST

## 2020-04-24 NOTE — PLAN OF CARE
Resumed care of pt. At [address]. Pt. Is Ax4.       CT of abd and pelvis  Carb diet    Accu: QID  Heparin drip at 10ml/HR  PIV:.9/75  Tele: CPO JOANA    IV Zosyn        Problem: Diabetes/Glucose Control  Goal: Glucose maintained within prescribed range  Descript FOR INFECTION - ADULT  Goal: Absence of fever/infection during anticipated neutropenic period  Description  INTERVENTIONS  - Monitor WBC  - Administer growth factors as ordered  - Implement neutropenic guidelines  Outcome: Progressing     Problem: METABOLI report abnormal signs of bleeding to staff  - Avoid use of toothpicks and dental floss  - Use electric shaver for shaving  - Use soft bristle tooth brush  - Limit straining and forceful nose blowing  Outcome: Progressing     Problem: Impaired Functional [place]

## 2020-04-24 NOTE — PLAN OF CARE
Patient alert and oriented x4, VS stable, RA, , NSR on tele  No complaint of pain or discomfort  Indium scan tomorrow  IV Zosyn  IVF  BM today  Patient updated on plan of care  Will continue to monitor    Problem: Diabetes/Glucose Control  Goal: Glucose

## 2020-04-24 NOTE — PROGRESS NOTES
[facility]  Progress Note    Margaret Espinal Patient Status:  Inpatient    1940 MRN [id number]   [facility] 3NE-A Attending Seth Painter, DO   Hosp Day # 6 PCP ALICIA DUNAWAY       SUBJECTIVE:    Afebrile, no bleeding.     OBJECTIV tamsulosin HCl  0.4 mg Oral Daily   • Insulin Aspart Pen  1-10 Units Subcutaneous TID AC and HS   • piperacillin-tazobactam  3.375 g Intravenous Q8H     acetaminophen, ondansetron HCl, Metoclopramide HCl, glucose **OR** Glucose-Vitamin C **OR** dextrose **

## 2020-04-24 NOTE — PROGRESS NOTES
[facility]                INFECTIOUS DISEASE PROGRESS NOTE    Luisitolux Varma Patient Status:  Inpatient    1940 MRN [id number]   [facility] 3NW-A Attending Sang Bay MD   Hosp Day # 6 PCP ALICIA DUNAWAY     Antibiotics: ALKPHO 110  --  96  --   --    AST 57*  --  21  --   --    ALT 44  --  27  --   --    BILT 0.7  --  0.6  --   --    TP 7.2  --  6.1*  --   --     < > = values in this interval not displayed.        No results found for: [facility]

## 2020-04-24 NOTE — OCCUPATIONAL THERAPY NOTE
OCCUPATIONAL THERAPY EVALUATION - INPATIENT     Room Number: 8177/4382-U  Evaluation Date: 4/24/2020  Type of Evaluation: Initial  Presenting Problem: Dyspena    Physician Order: IP Consult to Occupational Therapy  Reason for Therapy: ADL/IADL Dysfunction Yes  Patient Regularly Uses: Reading glasses; Hearing aides    Prior Level of Function: Pt reports IND with ADL and IADL. Pt loves to golf and just returned from recent trip to [place]. Pt has daughter in [place].      SUBJECTIVE   \"Can you hear me better now that I None    AM-PAC Score:  Score: 22  Approx Degree of Impairment: 25.8%  Standardized Score (AM-PAC Scale): 47.1  CMS Modifier (G-Code): CJ    FUNCTIONAL TRANSFER ASSESSMENT  Supine to Sit : Moderate assistance  Sit to Stand: Minimum assistance    Skilled The patient is functioning below his previous functional level and would benefit from skilled inpatient OT to address the above deficits, maximizing patient’s ability to return safely to his prior level of function.     Patient Complexity  Occupational Profile/

## 2020-04-24 NOTE — PROGRESS NOTES
[facility]  Progress Note     Radha Wallace Patient Status:  Inpatient    1940 MRN [id number]   [facility] 3NW-A Attending Dr. Gage Fischer Day # 6 PCP [address] DU[address]     Chief Complaint: SOB    S:  Patient reports his dtr this interval not displayed. Estimated Creatinine Clearance: 39 mL/min (A) (based on SCr of 1.51 mg/dL (H)). No results for input(s): PTP, INR in the last 168 hours.   Recent Labs   Lab 04/17/20  1933 04/18/20  0221 04/18/20  0640   TROP 0.299* 0.177* LUTS  12. BPH  1. Proscar , Last PSA 1.65  13. Hyponatremia, resolved  14. Incidental 1.8 cm nodule in the right lobe of the thyroid gland- outpt f/u   15.  Incidental newly detected solid pulmonary nodule of 6.1-8 mm average diameter- outpt f/u     Quality

## 2020-04-24 NOTE — PHYSICAL THERAPY NOTE
PHYSICAL THERAPY TREATMENT NOTE - INPATIENT    Room Number: 5395/6250-T     Session: 2   Number of Visits to Meet Established Goals: 5    Presenting Problem: chronic B DVT, leukocytosis    History related to current admission: Pt is a 68 y.o. male admitte Standing: Poor +    ACTIVITY TOLERANCE                         O2 WALK                    AM-PAC '6-Clicks' INPATIENT SHORT FORM - BASIC MOBILITY  How much difficulty does the patient currently have. ..  -   Turning over in bed (including adjusting bedcloth just go hungry\". Assisted pt with calling in order , pt left in chair, needs met. Patient End of Session: Up in chair;Needs met;Call light within reach;RN aware of session/findings; All patient questions and concerns addressed; Alarm set    ASSESSME

## 2020-04-25 VITALS
SYSTOLIC BLOOD PRESSURE: 141 MMHG | RESPIRATION RATE: 20 BRPM | HEART RATE: 73 BPM | OXYGEN SATURATION: 95 % | WEIGHT: 199.81 LBS | TEMPERATURE: 98 F | DIASTOLIC BLOOD PRESSURE: 62 MMHG | HEIGHT: 68 IN | BODY MASS INDEX: 30.28 KG/M2

## 2020-04-25 PROCEDURE — 99239 HOSP IP/OBS DSCHRG MGMT >30: CPT | Performed by: HOSPITALIST

## 2020-04-25 NOTE — DISCHARGE SUMMARY
[facility]OSPITAL[facility]  DISCHARGE SUMMARY     Umesh Greene Patient Status:  Inpatient    1940 MRN [id number]   [facility] 3NE-A Attending Jose De Jesus Yu, [address] [facility] Day # 7 PCP ALICIA DUNAWAY     Date of Admission: 2020  Date of Disch descriptions):  • None    Lab/Test results pending at Discharge:   · None    Consultants:  • ID  • Oncology     Discharge Medication List:     Discharge Medications      START taking these medications      Instructions Prescription details   apixaban 5 MG appointment:   Wiley Andersen  [address] [place] [address] Darrian  Jocelyne Grant  [phone]    Schedule an appointment as soon as possible for a visit in 1 week      Appointments for Next 30 Days 4/25/2020 - 5/25/2020    None      ---------------------------------------

## 2020-04-25 NOTE — PROGRESS NOTES
[facility]                INFECTIOUS DISEASE PROGRESS NOTE    Alyssa Lozano Patient Status:  Inpatient    1940 MRN [id number]   [facility] 3NW-A Attending Steffany Hilliard MD   Hosp Day # 7 PCP ALICIA DUNAWAY       Subjective Ref Range    Blood Culture Result No Growth 5 Days N/A         Radiology    CT chest/abd/pelvis -perinephric stranding,  Lungs; no consolidation    Problem list reviewed:  Patient Active Problem List:     Hyponatremia     Leukocytosis     Azotemia     Hype

## 2020-04-25 NOTE — PLAN OF CARE
Received patient awake in bed. Denies any pain. On room air, clear lungs. [place], with hearing aid. NSR. On Heparin drip for bilateral DVT. IV fluids. QID accuchecks. Up with GB.       Problem: Diabetes/Glucose Control  Goal: Glucose maintained within prescrib Progressing     Problem: RISK FOR INFECTION - ADULT  Goal: Absence of fever/infection during anticipated neutropenic period  Description  INTERVENTIONS  - Monitor WBC  - Administer growth factors as ordered  - Implement neutropenic guidelines  Outcome: Pro Monitor lab trends  - Patient is to report abnormal signs of bleeding to staff  - Avoid use of toothpicks and dental floss  - Use electric shaver for shaving  - Use soft bristle tooth brush  - Limit straining and forceful nose blowing  Outcome: Progressing

## 2020-04-25 NOTE — PLAN OF CARE
Problem: Diabetes/Glucose Control  Goal: Glucose maintained within prescribed range  Description  INTERVENTIONS:  - Monitor Blood Glucose as ordered  - Assess for signs and symptoms of hyperglycemia and hypoglycemia  - Administer ordered medications to m ordered  - Implement neutropenic guidelines  Outcome: Completed     Problem: METABOLIC/FLUID AND ELECTROLYTES - ADULT  Goal: Glucose maintained within prescribed range  Description  INTERVENTIONS:  - Monitor Blood Glucose as ordered  - Assess for signs and forceful nose blowing  Outcome: Completed     Problem: Impaired Functional Mobility  Goal: Achieve highest/safest level of mobility/gait  Description  Interventions:  - Assess patient's functional ability and stability  - Promote increasing activity/tolera

## 2020-04-25 NOTE — PROGRESS NOTES
[facility]  Progress Note     Padmini Roldan Patient Status:  Inpatient    1940 MRN [id number]   [facility] 3NW-A Attending Dr. Maryann Cramer Day # 7 PCP [address]     Chief Complaint: SOB    S:  Patient seen and examin Subcutaneous TID CC   • insulin detemir  12 Units Subcutaneous Nightly   • finasteride  5 mg Oral Daily   • Montelukast Sodium  10 mg Oral Nightly   • Pantoprazole Sodium  20 mg Oral QAM AC   • tamsulosin HCl  0.4 mg Oral Daily   • Insulin Aspart Pen  1-10

## 2020-04-27 ENCOUNTER — TELEPHONE (OUTPATIENT)
Dept: INTERNAL MEDICINE | Age: 80
End: 2020-04-27

## 2020-04-27 ENCOUNTER — TELEPHONE (OUTPATIENT)
Dept: HEMATOLOGY/ONCOLOGY | Facility: HOSPITAL | Age: 80
End: 2020-04-27

## 2020-04-27 RX ORDER — FUROSEMIDE 20 MG/1
20 TABLET ORAL DAILY
Qty: 5 TABLET | Refills: 0 | Status: SHIPPED | OUTPATIENT
Start: 2020-04-27 | End: 2021-05-05 | Stop reason: ALTCHOICE

## 2020-04-27 NOTE — TELEPHONE ENCOUNTER
----- Message from Blanca Bell MD sent at 4/27/2020 10:38 AM CDT -----  This is [facility] patient.  ----- Message -----  From: Lu Meehan MD  Sent: 4/27/2020   9:53 AM CDT  To: Blanca Bell MD, [facility]    Please schedule post hosp f/u

## 2020-04-29 ENCOUNTER — TELEPHONE (OUTPATIENT)
Dept: INTERNAL MEDICINE | Age: 80
End: 2020-04-29

## 2020-04-29 ENCOUNTER — OFFICE VISIT (OUTPATIENT)
Dept: INTERNAL MEDICINE | Age: 80
End: 2020-04-29

## 2020-04-29 ENCOUNTER — LAB REQUISITION (OUTPATIENT)
Dept: LAB | Facility: HOSPITAL | Age: 80
End: 2020-04-29
Payer: MEDICARE

## 2020-04-29 DIAGNOSIS — H53.8 BLURRY VISION, LEFT EYE: Primary | ICD-10-CM

## 2020-04-29 DIAGNOSIS — R29.898 WEAKNESS OF BOTH LOWER EXTREMITIES: Primary | ICD-10-CM

## 2020-04-29 DIAGNOSIS — Z79.4 CONTROLLED TYPE 2 DIABETES MELLITUS WITHOUT COMPLICATION, WITH LONG-TERM CURRENT USE OF INSULIN (CMD): ICD-10-CM

## 2020-04-29 DIAGNOSIS — E11.9 CONTROLLED TYPE 2 DIABETES MELLITUS WITHOUT COMPLICATION, WITH LONG-TERM CURRENT USE OF INSULIN (CMD): ICD-10-CM

## 2020-04-29 DIAGNOSIS — I82.513 CHRONIC EMBOLISM AND THROMBOSIS OF FEMORAL VEIN, BILATERAL (HCC): ICD-10-CM

## 2020-04-29 DIAGNOSIS — D72.829 LEUKOCYTOSIS, UNSPECIFIED TYPE: ICD-10-CM

## 2020-04-29 DIAGNOSIS — N18.30 CHRONIC KIDNEY DISEASE, STAGE 3 (MODERATE): ICD-10-CM

## 2020-04-29 PROCEDURE — 85025 COMPLETE CBC W/AUTO DIFF WBC: CPT | Performed by: INTERNAL MEDICINE

## 2020-04-29 PROCEDURE — 80048 BASIC METABOLIC PNL TOTAL CA: CPT | Performed by: INTERNAL MEDICINE

## 2020-04-29 PROCEDURE — 99443 TELEPHONE E&M BY PHYSICIAN EST PT NOT ORIG PREV 7 DAYS 21-30 MIN: CPT | Performed by: INTERNAL MEDICINE

## 2020-04-29 RX ORDER — APIXABAN 5 MG/1
TABLET, FILM COATED ORAL
COMMUNITY
Start: 2020-04-23 | End: 2020-09-21 | Stop reason: ALTCHOICE

## 2020-04-29 SDOH — HEALTH STABILITY: MENTAL HEALTH: HOW OFTEN DO YOU HAVE A DRINK CONTAINING ALCOHOL?: NOT ASKED

## 2020-04-29 ASSESSMENT — ENCOUNTER SYMPTOMS
FEVER: 0
CHILLS: 0
SHORTNESS OF BREATH: 0

## 2020-04-29 ASSESSMENT — PATIENT HEALTH QUESTIONNAIRE - PHQ9
SUM OF ALL RESPONSES TO PHQ9 QUESTIONS 1 AND 2: 4
2. FEELING DOWN, DEPRESSED OR HOPELESS: NEARLY EVERY DAY
1. LITTLE INTEREST OR PLEASURE IN DOING THINGS: SEVERAL DAYS
SUM OF ALL RESPONSES TO PHQ9 QUESTIONS 1 AND 2: 4

## 2020-04-30 ENCOUNTER — OFF PREMISE (OUTPATIENT)
Dept: INTERNAL MEDICINE | Age: 80
End: 2020-04-30

## 2020-04-30 ENCOUNTER — OFFICE VISIT (OUTPATIENT)
Dept: OPHTHALMOLOGY | Age: 80
End: 2020-04-30

## 2020-04-30 ENCOUNTER — EXTERNAL RECORD (OUTPATIENT)
Dept: HEALTH INFORMATION MANAGEMENT | Facility: OTHER | Age: 80
End: 2020-04-30

## 2020-04-30 ENCOUNTER — TELEPHONE (OUTPATIENT)
Dept: OPHTHALMOLOGY | Age: 80
End: 2020-04-30

## 2020-04-30 DIAGNOSIS — G45.3 AMAUROSIS FUGAX OF LEFT EYE: Primary | ICD-10-CM

## 2020-04-30 DIAGNOSIS — E11.9 TYPE 2 DIABETES MELLITUS WITHOUT RETINOPATHY (CMD): ICD-10-CM

## 2020-04-30 DIAGNOSIS — I82.513 CHRONIC EMBOLISM AND THROMBOSIS OF BOTH FEMORAL VEINS (CMD): Primary | ICD-10-CM

## 2020-04-30 PROCEDURE — 93010 ELECTROCARDIOGRAM REPORT: CPT | Performed by: INTERNAL MEDICINE

## 2020-04-30 PROCEDURE — 99205 OFFICE O/P NEW HI 60 MIN: CPT | Performed by: OPHTHALMOLOGY

## 2020-04-30 RX ORDER — PREDNISONE 20 MG/1
60 TABLET ORAL DAILY
Qty: 30 TABLET | Refills: 0 | Status: SHIPPED | OUTPATIENT
Start: 2020-04-30 | End: 2020-05-07 | Stop reason: SDUPTHER

## 2020-04-30 SDOH — HEALTH STABILITY: MENTAL HEALTH: HOW OFTEN DO YOU HAVE A DRINK CONTAINING ALCOHOL?: NOT ASKED

## 2020-05-01 PROCEDURE — 99236 HOSP IP/OBS SAME DATE HI 85: CPT | Performed by: HOSPITALIST

## 2020-05-01 PROCEDURE — 93306 TTE W/DOPPLER COMPLETE: CPT | Performed by: INTERNAL MEDICINE

## 2020-05-04 ENCOUNTER — TELEPHONE (OUTPATIENT)
Dept: INTERNAL MEDICINE | Age: 80
End: 2020-05-04

## 2020-05-04 ENCOUNTER — TELEPHONE (OUTPATIENT)
Dept: OPHTHALMOLOGY | Age: 80
End: 2020-05-04

## 2020-05-05 ENCOUNTER — TELEPHONE (OUTPATIENT)
Dept: INTERNAL MEDICINE | Age: 80
End: 2020-05-05

## 2020-05-06 ENCOUNTER — TELEPHONE (OUTPATIENT)
Dept: INTERNAL MEDICINE | Age: 80
End: 2020-05-06

## 2020-05-06 DIAGNOSIS — H54.62 VISION LOSS OF LEFT EYE: ICD-10-CM

## 2020-05-06 DIAGNOSIS — M62.81 MUSCLE WEAKNESS: Primary | ICD-10-CM

## 2020-05-06 DIAGNOSIS — R26.89 IMBALANCE: ICD-10-CM

## 2020-05-07 ENCOUNTER — LAB SERVICES (OUTPATIENT)
Dept: FAMILY MEDICINE | Age: 80
End: 2020-05-07

## 2020-05-07 ENCOUNTER — TELEPHONE (OUTPATIENT)
Dept: INTERNAL MEDICINE | Age: 80
End: 2020-05-07

## 2020-05-07 DIAGNOSIS — E11.9 CONTROLLED TYPE 2 DIABETES MELLITUS WITHOUT COMPLICATION, WITH LONG-TERM CURRENT USE OF INSULIN (CMD): ICD-10-CM

## 2020-05-07 DIAGNOSIS — R53.1 WEAKNESS: ICD-10-CM

## 2020-05-07 DIAGNOSIS — R63.4 WEIGHT LOSS: ICD-10-CM

## 2020-05-07 DIAGNOSIS — Z79.4 CONTROLLED TYPE 2 DIABETES MELLITUS WITHOUT COMPLICATION, WITH LONG-TERM CURRENT USE OF INSULIN (CMD): ICD-10-CM

## 2020-05-07 DIAGNOSIS — D47.2 GAMMOPATHY, MONOCLONAL: ICD-10-CM

## 2020-05-07 LAB
BASOPHIL %: 0.1 % (ref 0–1.2)
BASOPHIL ABSOLUTE #: 0 10*3/UL (ref 0–0.1)
DIFFERENTIAL TYPE: ABNORMAL
EOSINOPHIL %: 1.5 % (ref 0–10)
EOSINOPHIL ABSOLUTE #: 0.2 10*3/UL (ref 0–0.5)
HEMATOCRIT: 35.5 % (ref 40–51)
HEMOGLOBIN: 11 G/DL (ref 13.7–17.5)
LYMPH PERCENT: 22.3 % (ref 20.5–51.1)
LYMPHOCYTE ABSOLUTE #: 3 10*3/UL (ref 1.2–3.4)
MEAN CORPUSCULAR HGB CONCENTRATION: 31 % (ref 32–36)
MEAN CORPUSCULAR HGB: 29.1 PG (ref 27–34)
MEAN CORPUSCULAR VOLUME: 93.9 FL (ref 79–95)
MEAN PLATELET VOLUME: 9.3 FL (ref 8.6–12.4)
MONOCYTE ABSOLUTE #: 1 10*3/UL (ref 0.2–0.9)
MONOCYTE PERCENT: 7.4 % (ref 4.3–12.9)
NEUTROPHIL ABSOLUTE #: 9.4 10*3/UL (ref 1.4–6.5)
NEUTROPHIL PERCENT: 68.7 % (ref 34–73.5)
PLATELET COUNT: 522 10*3/UL (ref 150–400)
RED BLOOD CELL COUNT: 3.78 10*6/UL (ref 3.9–5.7)
RED CELL DISTRIBUTION WIDTH: 15.6 % (ref 11.3–14.8)
SEDIMENTATION RATE, RBC: 45 MM/H (ref 0–10)
WHITE BLOOD CELL COUNT: 13.7 10*3/UL (ref 4–10)

## 2020-05-07 PROCEDURE — 36415 COLL VENOUS BLD VENIPUNCTURE: CPT | Performed by: PATHOLOGY

## 2020-05-07 PROCEDURE — 85025 COMPLETE CBC W/AUTO DIFF WBC: CPT | Performed by: PATHOLOGY

## 2020-05-07 PROCEDURE — 85652 RBC SED RATE AUTOMATED: CPT | Performed by: PATHOLOGY

## 2020-05-07 RX ORDER — PREDNISONE 20 MG/1
TABLET ORAL
Qty: 22 TABLET | Refills: 0 | Status: SHIPPED | OUTPATIENT
Start: 2020-05-07 | End: 2020-06-01 | Stop reason: SDUPTHER

## 2020-05-08 ENCOUNTER — LAB SERVICES (OUTPATIENT)
Dept: FAMILY MEDICINE | Age: 80
End: 2020-05-08

## 2020-05-08 ENCOUNTER — TELEPHONE (OUTPATIENT)
Dept: INTERNAL MEDICINE | Age: 80
End: 2020-05-08

## 2020-05-08 DIAGNOSIS — D47.2 GAMMOPATHY, MONOCLONAL: ICD-10-CM

## 2020-05-08 LAB
ALBUMIN SERPL-MCNC: 3.3 G/DL (ref 3.6–5.1)
ALP SERPL-CCNC: 86 U/L (ref 45–115)
ALT SERPL W/O P-5'-P-CCNC: 20 U/L (ref 5–49)
AST SERPL-CCNC: 18 U/L (ref 14–43)
BILIRUB SERPL-MCNC: 0.4 MG/DL (ref 0–1.3)
BUN SERPL-MCNC: 40 MG/DL (ref 6–27)
CALCIUM SERPL-MCNC: 8.8 MG/DL (ref 8.6–10.6)
CHLORIDE SERPL-SCNC: 98 MMOL/L (ref 96–107)
CO2 SERPL-SCNC: 33 MMOL/L (ref 22–32)
CREAT SERPL-MCNC: 1.3 MG/DL (ref 0.6–1.6)
GFR SERPL CREATININE-BSD FRML MDRD: 53 ML/MIN/{1.73M2}
GFR SERPL CREATININE-BSD FRML MDRD: >60 ML/MIN/{1.73M2}
GLUCOSE SERPL-MCNC: 66 MG/DL (ref 70–200)
IFE, WITH IGG,IGA,IGA, INTERPRETATION: NORMAL
IGA SERPL-MCNC: 246 MG/DL (ref 82–453)
IGG SERPL-MCNC: 1590 MG/DL (ref 751–1560)
IGM SERPL-MCNC: 50 MG/DL (ref 46–304)
KAPPA LC FREE SER-MCNC: 19.78 MG/DL (ref 0.33–1.94)
KAPPA LC/LAMBDA SER: 6.77 {RATIO} (ref 0.26–1.65)
LAMBDA LC FREE SERPL-MCNC: 2.92 MG/DL (ref 0.57–2.63)
PATH SERP REV: ABNORMAL
POTASSIUM SERPL-SCNC: 4 MMOL/L (ref 3.5–5.3)
PROT SERPL-MCNC: 6.9 G/DL (ref 6.4–8.5)
SODIUM SERPL-SCNC: 137 MMOL/L (ref 136–146)

## 2020-05-08 PROCEDURE — 80053 COMPREHEN METABOLIC PANEL: CPT | Performed by: PATHOLOGY

## 2020-05-08 PROCEDURE — 36415 COLL VENOUS BLD VENIPUNCTURE: CPT | Performed by: PATHOLOGY

## 2020-05-11 ENCOUNTER — APPOINTMENT (OUTPATIENT)
Dept: INTERNAL MEDICINE | Age: 80
End: 2020-05-11

## 2020-05-11 ENCOUNTER — MED INFO FORMS (OUTPATIENT)
Dept: HEALTH INFORMATION MANAGEMENT | Facility: OTHER | Age: 80
End: 2020-05-11

## 2020-05-11 ENCOUNTER — OFFICE VISIT (OUTPATIENT)
Dept: INTERNAL MEDICINE | Age: 80
End: 2020-05-11

## 2020-05-11 DIAGNOSIS — E11.9 CONTROLLED TYPE 2 DIABETES MELLITUS WITHOUT COMPLICATION, WITH LONG-TERM CURRENT USE OF INSULIN (CMD): ICD-10-CM

## 2020-05-11 DIAGNOSIS — M35.3 PMR (POLYMYALGIA RHEUMATICA) (CMD): Primary | ICD-10-CM

## 2020-05-11 DIAGNOSIS — Z79.4 CONTROLLED TYPE 2 DIABETES MELLITUS WITHOUT COMPLICATION, WITH LONG-TERM CURRENT USE OF INSULIN (CMD): ICD-10-CM

## 2020-05-11 LAB — FAX RESULTS: NORMAL

## 2020-05-11 PROCEDURE — 99214 OFFICE O/P EST MOD 30 MIN: CPT | Performed by: INTERNAL MEDICINE

## 2020-05-11 SDOH — HEALTH STABILITY: MENTAL HEALTH
STRESS IS WHEN SOMEONE FEELS TENSE, NERVOUS, ANXIOUS, OR CAN'T SLEEP AT NIGHT BECAUSE THEIR MIND IS TROUBLED. HOW STRESSED ARE YOU?: NOT AT ALL

## 2020-05-11 SDOH — HEALTH STABILITY: MENTAL HEALTH: HOW OFTEN DO YOU HAVE A DRINK CONTAINING ALCOHOL?: NOT ASKED

## 2020-05-11 SDOH — HEALTH STABILITY: PHYSICAL HEALTH: ON AVERAGE, HOW MANY MINUTES DO YOU ENGAGE IN EXERCISE AT THIS LEVEL?: 30 MIN

## 2020-05-11 SDOH — HEALTH STABILITY: PHYSICAL HEALTH: ON AVERAGE, HOW MANY DAYS PER WEEK DO YOU ENGAGE IN MODERATE TO STRENUOUS EXERCISE (LIKE A BRISK WALK)?: 2 DAYS

## 2020-05-11 ASSESSMENT — ENCOUNTER SYMPTOMS
SHORTNESS OF BREATH: 0
CHILLS: 0
FEVER: 0

## 2020-05-11 ASSESSMENT — PATIENT HEALTH QUESTIONNAIRE - PHQ9
SUM OF ALL RESPONSES TO PHQ9 QUESTIONS 1 AND 2: 0
SUM OF ALL RESPONSES TO PHQ9 QUESTIONS 1 AND 2: 0
2. FEELING DOWN, DEPRESSED OR HOPELESS: NOT AT ALL
1. LITTLE INTEREST OR PLEASURE IN DOING THINGS: NOT AT ALL

## 2020-05-11 ASSESSMENT — PAIN SCALES - GENERAL: PAINLEVEL: 0

## 2020-05-12 LAB — FAX RESULTS: NORMAL

## 2020-05-13 ENCOUNTER — OFF PREMISE (OUTPATIENT)
Dept: INTERNAL MEDICINE | Age: 80
End: 2020-05-13

## 2020-05-18 ENCOUNTER — OFFICE VISIT (OUTPATIENT)
Dept: OPHTHALMOLOGY | Age: 80
End: 2020-05-18
Attending: INTERNAL MEDICINE

## 2020-05-18 DIAGNOSIS — G45.3 AMAUROSIS FUGAX OF LEFT EYE: Primary | ICD-10-CM

## 2020-05-18 PROCEDURE — 99213 OFFICE O/P EST LOW 20 MIN: CPT | Performed by: OPHTHALMOLOGY

## 2020-05-20 ENCOUNTER — TELEPHONE (OUTPATIENT)
Dept: HEMATOLOGY/ONCOLOGY | Facility: HOSPITAL | Age: 80
End: 2020-05-20

## 2020-05-20 NOTE — TELEPHONE ENCOUNTER
Devin Lopez called in this morning to schedule his post hospital f/u with Dr. Paul Hoyos for his blood clots. He wanted to get a new order for the [facility] in [place] for his blood thinner medication due to he has 18 tablets left. Thank you.      Returned the call and let

## 2020-05-26 ENCOUNTER — TELEPHONE (OUTPATIENT)
Dept: INTERNAL MEDICINE | Age: 80
End: 2020-05-26

## 2020-05-27 ENCOUNTER — OFFICE VISIT (OUTPATIENT)
Dept: HEMATOLOGY/ONCOLOGY | Facility: HOSPITAL | Age: 80
End: 2020-05-27
Attending: INTERNAL MEDICINE
Payer: MEDICARE

## 2020-05-27 VITALS
OXYGEN SATURATION: 96 % | DIASTOLIC BLOOD PRESSURE: 67 MMHG | HEIGHT: 68 IN | RESPIRATION RATE: 16 BRPM | SYSTOLIC BLOOD PRESSURE: 119 MMHG | WEIGHT: 185 LBS | HEART RATE: 94 BPM | TEMPERATURE: 98 F | BODY MASS INDEX: 28.04 KG/M2

## 2020-05-27 DIAGNOSIS — D47.2 SMOLDERING MYELOMA: ICD-10-CM

## 2020-05-27 DIAGNOSIS — N28.9 RENAL INSUFFICIENCY: ICD-10-CM

## 2020-05-27 DIAGNOSIS — D72.9 NEUTROPHILIC LEUKOCYTOSIS: ICD-10-CM

## 2020-05-27 DIAGNOSIS — I82.513 CHRONIC BILATERAL DEEP VEIN THROMBOSIS (DVT) OF FEMORAL VEINS (HCC): Primary | ICD-10-CM

## 2020-05-27 DIAGNOSIS — I82.813 SUPERFICIAL THROMBOSIS OF BOTH LOWER EXTREMITIES: ICD-10-CM

## 2020-05-27 PROCEDURE — 99215 OFFICE O/P EST HI 40 MIN: CPT | Performed by: INTERNAL MEDICINE

## 2020-05-27 NOTE — PROGRESS NOTES
Hematology/Oncology Clinic Follow Up Visit    Patient Name: Cy Palafox  Medical Record Number: [id number]    YOB: 1940    PCP: Dr. Trinidad Talavera   Other providers:  Dr. Diana Byers (med onc, [place], [place])     Reason for Consultation:  Spring Andrade electrophoresis and immunofixation were negative for monoclonal protein.    -7/19/17- bmbx- high normal cellular bone marrow for age (36 to 50% plasmacytosis (plasma cell count 11% consistent with involvement by plasma cell dyscrasia.   Trilineage hematopoi hgb 14.7, plt 269, cre 1.8  -2/6/20- wbc 10.9, hgb 14.8, plt 237, cre 1.79  -4/1/20- wbc 11.0, ANC 7.9K, , hgb 14.1, plt 213, cre 2.0  -4/15/20- wbc 25.6, ANC 22K, AMC 2300, hgb 12.7, plt 344, cre 2.5, DSDNA negative, B12 213, ESR 92, TSH 1.3, PSA 1 daily., Disp: 60 tablet, Rfl: 2  Cholecalciferol 25 MCG (1000 UT) Oral Tab, Take 1,000 Units by mouth daily. , Disp: , Rfl:   omega-3 fatty acids 1000 MG Oral Cap, Take 1,000 mg by mouth daily. , Disp: , Rfl:   glipiZIDE 10 MG Oral Tab, Take 10 mg by mouth 2 1027)  Do Not Use - Resp Rate: --  SpO2: 96 % (05/27 1027)    Wt Readings from Last 6 Encounters:  05/27/20 : 83.9 kg (185 lb)  04/21/20 : 90.6 kg (199 lb 12.8 oz)    Physical Examination:  General: Patient is alert and oriented, not in acute distress, + H aim to anticoagulate for a total of 3 months then stop so long as he continues to lack any bleeding complications.   -continue eliquis 5mg BID   -repeat BLE venous doppler at the 3 month pankaj to reassess status of clots  -repeat ddimer at the 3 mth pankaj.

## 2020-05-28 PROBLEM — I82.813: Status: ACTIVE | Noted: 2020-04-17

## 2020-06-01 ENCOUNTER — TELEPHONE (OUTPATIENT)
Dept: INTERNAL MEDICINE | Age: 80
End: 2020-06-01

## 2020-06-01 ENCOUNTER — TELEPHONE (OUTPATIENT)
Dept: HEMATOLOGY/ONCOLOGY | Facility: HOSPITAL | Age: 80
End: 2020-06-01

## 2020-06-01 NOTE — TELEPHONE ENCOUNTER
Received fax from [facility] about alternative medication for pt. Called pt to see if he is getting his medication or if he is getting it through the [facility]. Pt states he is going to be getting the eliquis delieverd tomorrow through the [facility].  He did pay out of po

## 2020-06-03 RX ORDER — PREDNISONE 10 MG/1
10 TABLET ORAL DAILY
Qty: 30 TABLET | Refills: 2 | Status: SHIPPED | OUTPATIENT
Start: 2020-06-03 | End: 2020-07-20 | Stop reason: SDUPTHER

## 2020-06-05 ENCOUNTER — OFF PREMISE (OUTPATIENT)
Dept: INTERNAL MEDICINE | Age: 80
End: 2020-06-05

## 2020-06-06 ENCOUNTER — TELEPHONE (OUTPATIENT)
Dept: SCHEDULING | Age: 80
End: 2020-06-06

## 2020-06-06 PROCEDURE — 93010 ELECTROCARDIOGRAM REPORT: CPT | Performed by: INTERNAL MEDICINE

## 2020-06-08 ENCOUNTER — TELEPHONE (OUTPATIENT)
Dept: INTERNAL MEDICINE | Age: 80
End: 2020-06-08

## 2020-06-09 ENCOUNTER — TELEPHONE (OUTPATIENT)
Dept: INTERNAL MEDICINE | Age: 80
End: 2020-06-09

## 2020-06-09 ENCOUNTER — OFFICE VISIT (OUTPATIENT)
Dept: OPHTHALMOLOGY | Age: 80
End: 2020-06-09

## 2020-06-09 DIAGNOSIS — H51.11 CONVERGENCE INSUFFICIENCY: Primary | ICD-10-CM

## 2020-06-09 PROCEDURE — 92060 SENSORIMOTOR EXAMINATION: CPT | Performed by: OPHTHALMOLOGY

## 2020-06-09 PROCEDURE — 99213 OFFICE O/P EST LOW 20 MIN: CPT | Performed by: OPHTHALMOLOGY

## 2020-06-09 SDOH — HEALTH STABILITY: MENTAL HEALTH: HOW OFTEN DO YOU HAVE A DRINK CONTAINING ALCOHOL?: NOT ASKED

## 2020-06-15 ENCOUNTER — EXTERNAL RECORD (OUTPATIENT)
Dept: HEALTH INFORMATION MANAGEMENT | Facility: OTHER | Age: 80
End: 2020-06-15

## 2020-06-19 ENCOUNTER — OFFICE VISIT (OUTPATIENT)
Dept: INTERNAL MEDICINE | Age: 80
End: 2020-06-19

## 2020-06-19 DIAGNOSIS — H53.2 DIPLOPIA: ICD-10-CM

## 2020-06-19 DIAGNOSIS — M35.3 PMR (POLYMYALGIA RHEUMATICA) (CMD): Primary | ICD-10-CM

## 2020-06-19 DIAGNOSIS — R70.0 ELEVATED SED RATE: ICD-10-CM

## 2020-06-19 PROCEDURE — 99214 OFFICE O/P EST MOD 30 MIN: CPT | Performed by: INTERNAL MEDICINE

## 2020-06-19 RX ORDER — OMEPRAZOLE 40 MG/1
40 CAPSULE, DELAYED RELEASE ORAL
COMMUNITY
End: 2020-12-16 | Stop reason: SDUPTHER

## 2020-06-19 SDOH — HEALTH STABILITY: PHYSICAL HEALTH: ON AVERAGE, HOW MANY DAYS PER WEEK DO YOU ENGAGE IN MODERATE TO STRENUOUS EXERCISE (LIKE A BRISK WALK)?: 2 DAYS

## 2020-06-19 SDOH — HEALTH STABILITY: PHYSICAL HEALTH: ON AVERAGE, HOW MANY MINUTES DO YOU ENGAGE IN EXERCISE AT THIS LEVEL?: 30 MIN

## 2020-06-19 SDOH — HEALTH STABILITY: MENTAL HEALTH: HOW OFTEN DO YOU HAVE A DRINK CONTAINING ALCOHOL?: NOT ASKED

## 2020-06-19 ASSESSMENT — ENCOUNTER SYMPTOMS
HEADACHES: 0
SHORTNESS OF BREATH: 0
FEVER: 0
CHILLS: 0

## 2020-06-19 ASSESSMENT — PATIENT HEALTH QUESTIONNAIRE - PHQ9
SUM OF ALL RESPONSES TO PHQ9 QUESTIONS 1 AND 2: 0
SUM OF ALL RESPONSES TO PHQ9 QUESTIONS 1 AND 2: 0
CLINICAL INTERPRETATION OF PHQ9 SCORE: NO FURTHER SCREENING NEEDED
1. LITTLE INTEREST OR PLEASURE IN DOING THINGS: NOT AT ALL
2. FEELING DOWN, DEPRESSED OR HOPELESS: NOT AT ALL
CLINICAL INTERPRETATION OF PHQ2 SCORE: NO FURTHER SCREENING NEEDED

## 2020-06-19 ASSESSMENT — PAIN SCALES - GENERAL: PAINLEVEL: 0

## 2020-06-24 ENCOUNTER — TELEPHONE (OUTPATIENT)
Dept: DERMATOLOGY | Age: 80
End: 2020-06-24

## 2020-07-02 ENCOUNTER — TELEPHONE (OUTPATIENT)
Dept: INTERNAL MEDICINE | Age: 80
End: 2020-07-02

## 2020-07-04 LAB
COVID-19 RESULT: NOT DETECTED
COVID-19 SOURCE: NORMAL

## 2020-07-06 ENCOUNTER — EXTERNAL RECORD (OUTPATIENT)
Dept: HEALTH INFORMATION MANAGEMENT | Facility: OTHER | Age: 80
End: 2020-07-06

## 2020-07-06 ENCOUNTER — EXTERNAL RECORD (OUTPATIENT)
Dept: OTHER | Age: 80
End: 2020-07-06

## 2020-07-06 LAB — BEDSIDE GLUCOSE: 139 MG/DL (ref 70–110)

## 2020-07-07 ENCOUNTER — OFFICE VISIT (OUTPATIENT)
Dept: OPHTHALMOLOGY | Age: 80
End: 2020-07-07

## 2020-07-07 DIAGNOSIS — H51.11 CONVERGENCE INSUFFICIENCY: Primary | ICD-10-CM

## 2020-07-07 PROCEDURE — 99213 OFFICE O/P EST LOW 20 MIN: CPT | Performed by: OPHTHALMOLOGY

## 2020-07-16 ENCOUNTER — LAB SERVICES (OUTPATIENT)
Dept: LAB | Age: 80
End: 2020-07-16

## 2020-07-16 ENCOUNTER — HOSPITAL ENCOUNTER (OUTPATIENT)
Dept: ULTRASOUND IMAGING | Age: 80
Discharge: HOME | End: 2020-07-16
Attending: INTERNAL MEDICINE
Payer: MEDICARE

## 2020-07-16 ENCOUNTER — IMAGING SERVICES (OUTPATIENT)
Dept: OTHER | Age: 80
End: 2020-07-16
Attending: INTERNAL MEDICINE

## 2020-07-16 DIAGNOSIS — R63.4 WEIGHT LOSS: ICD-10-CM

## 2020-07-16 DIAGNOSIS — I82.513 CHRONIC BILATERAL DEEP VEIN THROMBOSIS (DVT) OF FEMORAL VEINS (HCC): ICD-10-CM

## 2020-07-16 DIAGNOSIS — Z79.4 CONTROLLED TYPE 2 DIABETES MELLITUS WITHOUT COMPLICATION, WITH LONG-TERM CURRENT USE OF INSULIN (CMD): ICD-10-CM

## 2020-07-16 DIAGNOSIS — M35.3 PMR (POLYMYALGIA RHEUMATICA) (CMD): ICD-10-CM

## 2020-07-16 DIAGNOSIS — D47.2 SMOLDERING MYELOMA: ICD-10-CM

## 2020-07-16 DIAGNOSIS — D47.2 MONOCLONAL GAMMOPATHY: Primary | ICD-10-CM

## 2020-07-16 DIAGNOSIS — E11.9 CONTROLLED TYPE 2 DIABETES MELLITUS WITHOUT COMPLICATION, WITH LONG-TERM CURRENT USE OF INSULIN (CMD): ICD-10-CM

## 2020-07-16 DIAGNOSIS — R53.1 WEAKNESS: ICD-10-CM

## 2020-07-16 LAB
ALBUMIN SERPL-MCNC: 3.8 G/DL (ref 3.6–5.1)
ALP SERPL-CCNC: 82 U/L (ref 45–115)
ALT SERPL W/O P-5'-P-CCNC: 22 U/L (ref 5–49)
AST SERPL-CCNC: 15 U/L (ref 14–43)
BILIRUB SERPL-MCNC: 0.6 MG/DL (ref 0–1.3)
BUN SERPL-MCNC: 49 MG/DL (ref 6–27)
CALCIUM SERPL-MCNC: 9.5 MG/DL (ref 8.6–10.6)
CHLORIDE SERPL-SCNC: 101 MMOL/L (ref 96–107)
CHOLEST SERPL-MCNC: 222 MG/DL (ref 140–200)
CO2 SERPL-SCNC: 29 MMOL/L (ref 22–32)
CREAT SERPL-MCNC: 1.4 MG/DL (ref 0.6–1.6)
DIFFERENTIAL TYPE: ABNORMAL
EOSINOPHIL % MD: 3 % (ref 0–10)
EOSINOPHIL ABSOLUTE # MD: 0.5 /UL (ref 0–0.5)
EST. AVERAGE GLUCOSE BLD GHB EST-MCNC: 191 MG/DL (ref 0–154)
GFR SERPL CREATININE-BSD FRML MDRD: 49 ML/MIN/{1.73M2}
GFR SERPL CREATININE-BSD FRML MDRD: 59 ML/MIN/{1.73M2}
GLUCOSE P FAST SERPL-MCNC: 98 MG/DL (ref 60–100)
HBA1C MFR BLD: 8.3 % (ref 4.2–6)
HDLC SERPL-MCNC: 79 MG/DL
HEMATOCRIT: 42.9 % (ref 40–51)
HEMOGLOBIN: 14.1 G/DL (ref 13.7–17.5)
LDLC SERPL CALC-MCNC: 100 MG/DL (ref 30–100)
LYMPH PERCENT MD: 25 % (ref 20.5–51.1)
LYMPHOCYTE ABSOLUTE # MD: 3.8 /UL (ref 1.2–3.4)
MEAN CORPUSCULAR HGB CONCENTRATION: 32.9 % (ref 32–36)
MEAN CORPUSCULAR HGB: 30.8 PG (ref 27–34)
MEAN CORPUSCULAR VOLUME: 93.7 FL (ref 79–95)
MEAN PLATELET VOLUME: 9.9 FL (ref 8.6–12.4)
MONOCYTE ABSOLUTE # MD: 1.2 /UL (ref 0.2–0.9)
MONOCYTE PERCENT MD: 8 % (ref 4.3–12.9)
NEUTROPHIL ABSOLUTE # MD: 9.7 /UL (ref 1.4–6.5)
NEUTROPHIL PERCENT MD: 64 % (ref 34–73.5)
PLATELET COUNT: 198 10*3/UL (ref 150–400)
POTASSIUM SERPL-SCNC: 4.3 MMOL/L (ref 3.5–5.3)
PROT SERPL-MCNC: 6.2 G/DL (ref 6.4–8.5)
RBC & PLT MORPHOLOGY: NORMAL
RED BLOOD CELL COUNT: 4.58 10*6/UL (ref 3.9–5.7)
RED CELL DISTRIBUTION WIDTH: 17.9 % (ref 11.3–14.8)
SEDIMENTATION RATE, RBC: 17 MM/H (ref 0–10)
SODIUM SERPL-SCNC: 136 MMOL/L (ref 136–146)
TRIGL SERPL-MCNC: 215 MG/DL (ref 0–200)
WHITE BLOOD CELL COUNT: 15.2 10*3/UL (ref 4–10)

## 2020-07-16 PROCEDURE — 85652 RBC SED RATE AUTOMATED: CPT | Performed by: PATHOLOGY

## 2020-07-16 PROCEDURE — 80053 COMPREHEN METABOLIC PANEL: CPT | Performed by: PATHOLOGY

## 2020-07-16 PROCEDURE — 85027 COMPLETE CBC AUTOMATED: CPT | Performed by: PATHOLOGY

## 2020-07-16 PROCEDURE — 85007 BL SMEAR W/DIFF WBC COUNT: CPT | Performed by: PATHOLOGY

## 2020-07-16 PROCEDURE — 83036 HEMOGLOBIN GLYCOSYLATED A1C: CPT | Performed by: PATHOLOGY

## 2020-07-16 PROCEDURE — 36415 COLL VENOUS BLD VENIPUNCTURE: CPT | Performed by: PATHOLOGY

## 2020-07-16 PROCEDURE — 80061 LIPID PANEL: CPT | Performed by: PATHOLOGY

## 2020-07-16 PROCEDURE — 93970 EXTREMITY STUDY: CPT | Performed by: INTERNAL MEDICINE

## 2020-07-17 ENCOUNTER — OFFICE VISIT (OUTPATIENT)
Dept: HEMATOLOGY/ONCOLOGY | Age: 80
End: 2020-07-17
Attending: INTERNAL MEDICINE
Payer: MEDICARE

## 2020-07-17 ENCOUNTER — APPOINTMENT (OUTPATIENT)
Dept: HEMATOLOGY/ONCOLOGY | Facility: HOSPITAL | Age: 80
End: 2020-07-17
Attending: INTERNAL MEDICINE
Payer: MEDICARE

## 2020-07-17 ENCOUNTER — EXTERNAL RECORD (OUTPATIENT)
Dept: HEALTH INFORMATION MANAGEMENT | Facility: OTHER | Age: 80
End: 2020-07-17

## 2020-07-17 VITALS
HEIGHT: 67.99 IN | OXYGEN SATURATION: 96 % | RESPIRATION RATE: 18 BRPM | WEIGHT: 197 LBS | DIASTOLIC BLOOD PRESSURE: 86 MMHG | HEART RATE: 97 BPM | BODY MASS INDEX: 29.86 KG/M2 | SYSTOLIC BLOOD PRESSURE: 154 MMHG | TEMPERATURE: 98 F

## 2020-07-17 DIAGNOSIS — I82.513 CHRONIC BILATERAL DEEP VEIN THROMBOSIS (DVT) OF FEMORAL VEINS (HCC): Primary | ICD-10-CM

## 2020-07-17 DIAGNOSIS — D47.2 SMOLDERING MYELOMA: ICD-10-CM

## 2020-07-17 DIAGNOSIS — D72.9 NEUTROPHILIC LEUKOCYTOSIS: ICD-10-CM

## 2020-07-17 DIAGNOSIS — N28.9 RENAL INSUFFICIENCY: ICD-10-CM

## 2020-07-17 DIAGNOSIS — I82.813 SUPERFICIAL THROMBOSIS OF BOTH LOWER EXTREMITIES: ICD-10-CM

## 2020-07-17 LAB
ALBUMIN SERPL-MCNC: 3.1 G/DL (ref 3.4–5)
ALBUMIN/GLOB SERPL: 0.8 {RATIO} (ref 1–2)
ALP LIVER SERPL-CCNC: 118 U/L (ref 45–117)
ALT SERPL-CCNC: 32 U/L (ref 16–61)
ANION GAP SERPL CALC-SCNC: 10 MMOL/L (ref 0–18)
AST SERPL-CCNC: 19 U/L (ref 15–37)
BASOPHILS # BLD AUTO: 0.03 X10(3) UL (ref 0–0.2)
BASOPHILS NFR BLD AUTO: 0.1 %
BILIRUB SERPL-MCNC: 0.5 MG/DL (ref 0.1–2)
BUN BLD-MCNC: 52 MG/DL (ref 7–18)
BUN/CREAT SERPL: 29.1 (ref 10–20)
CALCIUM BLD-MCNC: 8.5 MG/DL (ref 8.5–10.1)
CHLORIDE SERPL-SCNC: 99 MMOL/L (ref 98–112)
CO2 SERPL-SCNC: 24 MMOL/L (ref 21–32)
CREAT BLD-MCNC: 1.79 MG/DL (ref 0.7–1.3)
D-DIMER: <0.27 UG/ML FEU (ref ?–0.78)
DEPRECATED RDW RBC AUTO: 59.1 FL (ref 35.1–46.3)
EOSINOPHIL # BLD AUTO: 0.02 X10(3) UL (ref 0–0.7)
EOSINOPHIL NFR BLD AUTO: 0.1 %
ERYTHROCYTE [DISTWIDTH] IN BLOOD BY AUTOMATED COUNT: 17.1 % (ref 11–15)
GLOBULIN PLAS-MCNC: 4.1 G/DL (ref 2.8–4.4)
GLUCOSE BLD-MCNC: 357 MG/DL (ref 70–99)
HCT VFR BLD AUTO: 44.4 % (ref 39–53)
HGB BLD-MCNC: 14.3 G/DL (ref 13–17.5)
IFE, WITH IGG,IGA,IGA, INTERPRETATION: NORMAL
IGA SERPL-MCNC: 167 MG/DL (ref 82–453)
IGG SERPL-MCNC: 762 MG/DL (ref 751–1560)
IGM SERPL-MCNC: 49 MG/DL (ref 46–304)
IMM GRANULOCYTES # BLD AUTO: 0.27 X10(3) UL (ref 0–1)
IMM GRANULOCYTES NFR BLD: 1.3 %
KAPPA LC FREE SER-MCNC: 7.2 MG/DL (ref 0.33–1.94)
KAPPA LC/LAMBDA SER: 4.39 {RATIO} (ref 0.26–1.65)
LAMBDA LC FREE SERPL-MCNC: 1.64 MG/DL (ref 0.57–2.63)
LYMPHOCYTES # BLD AUTO: 0.74 X10(3) UL (ref 1–4)
LYMPHOCYTES NFR BLD AUTO: 3.4 %
M PROTEIN MFR SERPL ELPH: 7.2 G/DL (ref 6.4–8.2)
MCH RBC QN AUTO: 30.1 PG (ref 26–34)
MCHC RBC AUTO-ENTMCNC: 32.2 G/DL (ref 31–37)
MCV RBC AUTO: 93.5 FL (ref 80–100)
MONOCYTES # BLD AUTO: 0.64 X10(3) UL (ref 0.1–1)
MONOCYTES NFR BLD AUTO: 3 %
NEUTROPHILS # BLD AUTO: 19.86 X10 (3) UL (ref 1.5–7.7)
NEUTROPHILS # BLD AUTO: 19.86 X10(3) UL (ref 1.5–7.7)
NEUTROPHILS NFR BLD AUTO: 92.1 %
OSMOLALITY SERPL CALC.SUM OF ELEC: 304 MOSM/KG (ref 275–295)
PATH SERP REV: ABNORMAL
PATIENT FASTING Y/N/NP: NO
PLATELET # BLD AUTO: 196 10(3)UL (ref 150–450)
POTASSIUM SERPL-SCNC: 5.1 MMOL/L (ref 3.5–5.1)
RBC # BLD AUTO: 4.75 X10(6)UL (ref 3.8–5.8)
SODIUM SERPL-SCNC: 133 MMOL/L (ref 136–145)
WBC # BLD AUTO: 21.6 X10(3) UL (ref 4–11)

## 2020-07-17 PROCEDURE — 99215 OFFICE O/P EST HI 40 MIN: CPT | Performed by: INTERNAL MEDICINE

## 2020-07-17 NOTE — PROGRESS NOTES
Hematology/Oncology Clinic Follow Up Visit    Patient Name: Chio Shah  Medical Record Number: [id number]    YOB: 1940    PCP: Dr. Eliazar Hewitt   Other providers:  Dr. Luciano Clark (med onc, [place], [place])     Reason for Consultation:  Akiko Ramos showed changes of diabetes and hypertension but no evidence of involvement by a monoclonal gammopathy or plasma cell dyscrasia. Low-dose CT scan skeletal survey on 2/25/19 showed no lytic bone disease.  Serum free light chain remains elevated at 16 with a k glomeruli shows prominent hyalinosis.  No significant endocapillary hypercellularity is identified. Sieper Del are no electron-dense immune complex deposits.  The arterioles show extensive hyalinization.     -9/19/18- wbc 9.5, hgb 15.1, plt 233  -4/2/19- wbc 9 daily., Disp: , Rfl:   insulin glargine 100 UNIT/ML Subcutaneous Solution, Inject 20 Units into the skin nightly., Disp: , Rfl:   lisinopril 10 MG Oral Tab, Take 10 mg by mouth daily. 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   Follows with Dr. Randell Boyd at [facility] [place], [place].   -seems to remain stable therefore will defer ongoing management to Dr. Randell Boyd at this time     *chronic neutrophilic leukocytosis  -ongoing, reactive- PMR, steroid use

## 2020-07-17 NOTE — PROGRESS NOTES
Education Record    Learner:  Patient    Disease / Diagnosis:DVT    Barriers / Limitations:  None   Comments:    Method:  Brief focused   Comments:    General Topics:  Plan of care reviewed   Comments:    Outcome:  Shows understanding   Comments:MD f/up.  P

## 2020-07-20 ENCOUNTER — OFFICE VISIT (OUTPATIENT)
Dept: INTERNAL MEDICINE | Age: 80
End: 2020-07-20

## 2020-07-20 DIAGNOSIS — E11.9 CONTROLLED TYPE 2 DIABETES MELLITUS WITHOUT COMPLICATION, WITH LONG-TERM CURRENT USE OF INSULIN (CMD): ICD-10-CM

## 2020-07-20 DIAGNOSIS — I82.513 CHRONIC EMBOLISM AND THROMBOSIS OF BOTH FEMORAL VEINS (CMD): ICD-10-CM

## 2020-07-20 DIAGNOSIS — Z79.4 CONTROLLED TYPE 2 DIABETES MELLITUS WITHOUT COMPLICATION, WITH LONG-TERM CURRENT USE OF INSULIN (CMD): ICD-10-CM

## 2020-07-20 DIAGNOSIS — M35.3 PMR (POLYMYALGIA RHEUMATICA) (CMD): Primary | ICD-10-CM

## 2020-07-20 LAB
FAX RESULTS: NORMAL

## 2020-07-20 PROCEDURE — 99214 OFFICE O/P EST MOD 30 MIN: CPT | Performed by: INTERNAL MEDICINE

## 2020-07-20 RX ORDER — PREDNISONE 10 MG/1
TABLET ORAL
Qty: 120 TABLET | Refills: 0 | Status: SHIPPED | OUTPATIENT
Start: 2020-07-20 | End: 2020-09-21 | Stop reason: SDUPTHER

## 2020-07-20 SDOH — HEALTH STABILITY: MENTAL HEALTH: HOW OFTEN DO YOU HAVE A DRINK CONTAINING ALCOHOL?: NOT ASKED

## 2020-07-20 SDOH — HEALTH STABILITY: PHYSICAL HEALTH: ON AVERAGE, HOW MANY DAYS PER WEEK DO YOU ENGAGE IN MODERATE TO STRENUOUS EXERCISE (LIKE A BRISK WALK)?: 0 DAYS

## 2020-07-20 SDOH — HEALTH STABILITY: PHYSICAL HEALTH: ON AVERAGE, HOW MANY MINUTES DO YOU ENGAGE IN EXERCISE AT THIS LEVEL?: 0 MIN

## 2020-07-20 ASSESSMENT — PATIENT HEALTH QUESTIONNAIRE - PHQ9
2. FEELING DOWN, DEPRESSED OR HOPELESS: NOT AT ALL
1. LITTLE INTEREST OR PLEASURE IN DOING THINGS: NOT AT ALL
CLINICAL INTERPRETATION OF PHQ2 SCORE: NO FURTHER SCREENING NEEDED
SUM OF ALL RESPONSES TO PHQ9 QUESTIONS 1 AND 2: 0
CLINICAL INTERPRETATION OF PHQ9 SCORE: NO FURTHER SCREENING NEEDED
SUM OF ALL RESPONSES TO PHQ9 QUESTIONS 1 AND 2: 0

## 2020-07-20 ASSESSMENT — ENCOUNTER SYMPTOMS
FEVER: 0
CHILLS: 0
SHORTNESS OF BREATH: 0

## 2020-07-20 ASSESSMENT — PAIN SCALES - GENERAL: PAINLEVEL: 0

## 2020-07-21 ENCOUNTER — TELEPHONE (OUTPATIENT)
Dept: OTOLARYNGOLOGY | Age: 80
End: 2020-07-21

## 2020-07-21 ENCOUNTER — OFFICE VISIT (OUTPATIENT)
Dept: DERMATOLOGY | Age: 80
End: 2020-07-21

## 2020-07-22 ENCOUNTER — OFFICE VISIT (OUTPATIENT)
Dept: OTOLARYNGOLOGY | Age: 80
End: 2020-07-22

## 2020-07-22 VITALS — BODY MASS INDEX: 27.99 KG/M2 | WEIGHT: 189 LBS | HEIGHT: 69 IN

## 2020-07-22 DIAGNOSIS — L73.9 NASAL FOLLICULITIS: ICD-10-CM

## 2020-07-22 DIAGNOSIS — H61.23 BILATERAL IMPACTED CERUMEN: Primary | ICD-10-CM

## 2020-07-22 PROCEDURE — 99203 OFFICE O/P NEW LOW 30 MIN: CPT | Performed by: OTOLARYNGOLOGY

## 2020-07-22 PROCEDURE — 69210 REMOVE IMPACTED EAR WAX UNI: CPT | Performed by: OTOLARYNGOLOGY

## 2020-07-22 RX ORDER — SULFAMETHOXAZOLE AND TRIMETHOPRIM 800; 160 MG/1; MG/1
1 TABLET ORAL 2 TIMES DAILY
Qty: 20 TABLET | Refills: 0 | Status: SHIPPED | OUTPATIENT
Start: 2020-07-22 | End: 2020-08-01

## 2020-08-18 ENCOUNTER — OFFICE VISIT (OUTPATIENT)
Dept: PODIATRY | Age: 80
End: 2020-08-18

## 2020-08-18 DIAGNOSIS — M20.42 HAMMER TOES OF BOTH FEET: ICD-10-CM

## 2020-08-18 DIAGNOSIS — E11.42 TYPE 2 DIABETES MELLITUS WITH DIABETIC POLYNEUROPATHY, WITHOUT LONG-TERM CURRENT USE OF INSULIN (CMD): Primary | ICD-10-CM

## 2020-08-18 DIAGNOSIS — L60.8 INCURVATED NAIL: ICD-10-CM

## 2020-08-18 DIAGNOSIS — M20.41 HAMMER TOES OF BOTH FEET: ICD-10-CM

## 2020-08-18 PROCEDURE — 99213 OFFICE O/P EST LOW 20 MIN: CPT | Performed by: PODIATRIST

## 2020-08-18 SDOH — HEALTH STABILITY: MENTAL HEALTH: HOW OFTEN DO YOU HAVE A DRINK CONTAINING ALCOHOL?: NOT ASKED

## 2020-09-21 ENCOUNTER — OFFICE VISIT (OUTPATIENT)
Dept: INTERNAL MEDICINE | Age: 80
End: 2020-09-21

## 2020-09-21 VITALS
BODY MASS INDEX: 30.36 KG/M2 | RESPIRATION RATE: 12 BRPM | HEART RATE: 76 BPM | HEIGHT: 69 IN | WEIGHT: 205 LBS | SYSTOLIC BLOOD PRESSURE: 140 MMHG | DIASTOLIC BLOOD PRESSURE: 70 MMHG

## 2020-09-21 DIAGNOSIS — M35.3 PMR (POLYMYALGIA RHEUMATICA) (CMD): ICD-10-CM

## 2020-09-21 DIAGNOSIS — I82.513 CHRONIC EMBOLISM AND THROMBOSIS OF BOTH FEMORAL VEINS (CMD): ICD-10-CM

## 2020-09-21 DIAGNOSIS — E11.9 CONTROLLED TYPE 2 DIABETES MELLITUS WITHOUT COMPLICATION, WITH LONG-TERM CURRENT USE OF INSULIN (CMD): Primary | ICD-10-CM

## 2020-09-21 DIAGNOSIS — Z79.4 CONTROLLED TYPE 2 DIABETES MELLITUS WITHOUT COMPLICATION, WITH LONG-TERM CURRENT USE OF INSULIN (CMD): Primary | ICD-10-CM

## 2020-09-21 PROCEDURE — 99214 OFFICE O/P EST MOD 30 MIN: CPT | Performed by: INTERNAL MEDICINE

## 2020-09-21 RX ORDER — PREDNISONE 2.5 MG/1
2.5 TABLET ORAL DAILY
Qty: 14 TABLET | Refills: 0 | Status: SHIPPED | OUTPATIENT
Start: 2020-09-21 | End: 2021-05-05 | Stop reason: ALTCHOICE

## 2020-09-21 RX ORDER — PANTOPRAZOLE SODIUM 40 MG/1
TABLET, DELAYED RELEASE ORAL
COMMUNITY
End: 2021-05-05 | Stop reason: ALTCHOICE

## 2020-09-21 ASSESSMENT — ENCOUNTER SYMPTOMS
FEVER: 0
SHORTNESS OF BREATH: 0
CHILLS: 0

## 2020-09-21 ASSESSMENT — PATIENT HEALTH QUESTIONNAIRE - PHQ9
SUM OF ALL RESPONSES TO PHQ9 QUESTIONS 1 AND 2: 0
SUM OF ALL RESPONSES TO PHQ9 QUESTIONS 1 AND 2: 0
2. FEELING DOWN, DEPRESSED OR HOPELESS: NOT AT ALL
CLINICAL INTERPRETATION OF PHQ2 SCORE: NO FURTHER SCREENING NEEDED
1. LITTLE INTEREST OR PLEASURE IN DOING THINGS: NOT AT ALL
CLINICAL INTERPRETATION OF PHQ9 SCORE: NO FURTHER SCREENING NEEDED

## 2020-10-06 ENCOUNTER — CASE MANAGEMENT (OUTPATIENT)
Dept: CARE COORDINATION | Age: 80
End: 2020-10-06

## 2020-12-11 RX ORDER — BLOOD SUGAR DIAGNOSTIC
STRIP MISCELLANEOUS
Qty: 200 STRIP | Refills: 1 | Status: SHIPPED | OUTPATIENT
Start: 2020-12-11

## 2020-12-11 RX ORDER — LANCETS
EACH MISCELLANEOUS
Qty: 204 EACH | Refills: 1 | Status: SHIPPED | OUTPATIENT
Start: 2020-12-11

## 2020-12-14 RX ORDER — FINASTERIDE 5 MG/1
5 TABLET, FILM COATED ORAL DAILY
Qty: 90 TABLET | Refills: 3 | Status: CANCELLED | OUTPATIENT
Start: 2020-12-14

## 2020-12-15 RX ORDER — MONTELUKAST SODIUM 10 MG/1
10 TABLET ORAL EVERY EVENING
Qty: 90 TABLET | Refills: 0 | Status: SHIPPED | OUTPATIENT
Start: 2020-12-15 | End: 2021-04-20 | Stop reason: SDUPTHER

## 2020-12-15 RX ORDER — OMEPRAZOLE 40 MG/1
40 CAPSULE, DELAYED RELEASE ORAL DAILY
Qty: 90 CAPSULE | Refills: 0 | OUTPATIENT
Start: 2020-12-15

## 2020-12-15 RX ORDER — TAMSULOSIN HYDROCHLORIDE 0.4 MG/1
0.8 CAPSULE ORAL DAILY
Qty: 180 CAPSULE | Refills: 0 | Status: SHIPPED | OUTPATIENT
Start: 2020-12-15 | End: 2021-04-22 | Stop reason: SDUPTHER

## 2020-12-15 RX ORDER — LISINOPRIL 10 MG/1
10 TABLET ORAL DAILY
Qty: 90 TABLET | Refills: 0 | OUTPATIENT
Start: 2020-12-15

## 2020-12-16 RX ORDER — LISINOPRIL 10 MG/1
10 TABLET ORAL DAILY
Qty: 90 TABLET | Refills: 3 | Status: SHIPPED | OUTPATIENT
Start: 2020-12-16 | End: 2022-02-03

## 2020-12-16 RX ORDER — OMEPRAZOLE 40 MG/1
40 CAPSULE, DELAYED RELEASE ORAL DAILY
Qty: 90 CAPSULE | Refills: 3 | Status: SHIPPED | OUTPATIENT
Start: 2020-12-16 | End: 2022-02-07

## 2020-12-21 RX ORDER — FINASTERIDE 5 MG/1
TABLET, FILM COATED ORAL
Qty: 90 TABLET | Refills: 0 | OUTPATIENT
Start: 2020-12-21

## 2020-12-23 RX ORDER — FINASTERIDE 5 MG/1
5 TABLET, FILM COATED ORAL DAILY
Qty: 90 TABLET | Refills: 3 | Status: SHIPPED | OUTPATIENT
Start: 2020-12-23 | End: 2021-11-01

## 2021-02-01 DIAGNOSIS — Z23 NEED FOR VACCINATION: ICD-10-CM

## 2021-03-10 DIAGNOSIS — Z23 NEED FOR VACCINATION: ICD-10-CM

## 2021-04-07 ENCOUNTER — OFFICE VISIT (OUTPATIENT)
Dept: OTOLARYNGOLOGY | Age: 81
End: 2021-04-07

## 2021-04-07 ENCOUNTER — OFFICE VISIT (OUTPATIENT)
Dept: AUDIOLOGY | Age: 81
End: 2021-04-07
Attending: OTOLARYNGOLOGY

## 2021-04-07 VITALS — WEIGHT: 191 LBS | HEIGHT: 69 IN | BODY MASS INDEX: 28.29 KG/M2

## 2021-04-07 DIAGNOSIS — H90.3 SENSORINEURAL HEARING LOSS (SNHL) OF BOTH EARS: Primary | ICD-10-CM

## 2021-04-07 DIAGNOSIS — H61.23 BILATERAL IMPACTED CERUMEN: ICD-10-CM

## 2021-04-07 DIAGNOSIS — B36.9 CHRONIC MYCOTIC OTITIS EXTERNA: ICD-10-CM

## 2021-04-07 DIAGNOSIS — H62.40 CHRONIC MYCOTIC OTITIS EXTERNA: ICD-10-CM

## 2021-04-07 DIAGNOSIS — H91.90 HEARING LOSS, UNSPECIFIED HEARING LOSS TYPE, UNSPECIFIED LATERALITY: Primary | ICD-10-CM

## 2021-04-07 PROCEDURE — 92557 COMPREHENSIVE HEARING TEST: CPT | Performed by: AUDIOLOGIST

## 2021-04-07 PROCEDURE — 99214 OFFICE O/P EST MOD 30 MIN: CPT | Performed by: OTOLARYNGOLOGY

## 2021-04-07 PROCEDURE — 69210 REMOVE IMPACTED EAR WAX UNI: CPT | Performed by: OTOLARYNGOLOGY

## 2021-04-07 RX ORDER — CLOTRIMAZOLE 1 G/ML
SOLUTION TOPICAL
Qty: 1 BOTTLE | Refills: 0 | Status: SHIPPED | OUTPATIENT
Start: 2021-04-07

## 2021-04-19 ENCOUNTER — LAB SERVICES (OUTPATIENT)
Dept: FAMILY MEDICINE | Age: 81
End: 2021-04-19

## 2021-04-19 DIAGNOSIS — D47.2 MONOCLONAL GAMMOPATHY: ICD-10-CM

## 2021-04-21 RX ORDER — MONTELUKAST SODIUM 10 MG/1
10 TABLET ORAL EVERY EVENING
Qty: 90 TABLET | Refills: 0 | Status: SHIPPED | OUTPATIENT
Start: 2021-04-21 | End: 2021-08-25

## 2021-04-22 ENCOUNTER — TELEPHONE (OUTPATIENT)
Dept: UROLOGY | Age: 81
End: 2021-04-22

## 2021-04-23 RX ORDER — TAMSULOSIN HYDROCHLORIDE 0.4 MG/1
0.8 CAPSULE ORAL DAILY
Qty: 180 CAPSULE | Refills: 0 | Status: SHIPPED | OUTPATIENT
Start: 2021-04-23 | End: 2022-04-25 | Stop reason: ALTCHOICE

## 2021-04-30 ENCOUNTER — LAB REQUISITION (OUTPATIENT)
Dept: LAB | Age: 81
End: 2021-04-30

## 2021-04-30 ENCOUNTER — LAB SERVICES (OUTPATIENT)
Dept: FAMILY MEDICINE | Age: 81
End: 2021-04-30

## 2021-04-30 DIAGNOSIS — D47.2 MONOCLONAL GAMMOPATHY: ICD-10-CM

## 2021-04-30 LAB
ALBUMIN SERPL-MCNC: 4.1 G/DL (ref 3.6–5.1)
ALP SERPL-CCNC: 77 U/L (ref 45–115)
ALT SERPL W/O P-5'-P-CCNC: 13 U/L (ref 5–49)
AST SERPL-CCNC: 15 U/L (ref 14–43)
BASOPHIL %: 1 % (ref 0–1.2)
BASOPHIL ABSOLUTE #: 0.1 10*3/UL (ref 0–0.1)
BILIRUB SERPL-MCNC: 0.5 MG/DL (ref 0–1.3)
BUN SERPL-MCNC: 35 MG/DL (ref 6–27)
CALCIUM SERPL-MCNC: 9.4 MG/DL (ref 8.6–10.6)
CHLORIDE SERPL-SCNC: 102 MMOL/L (ref 96–107)
CO2 SERPL-SCNC: 25 MMOL/L (ref 22–32)
CREAT SERPL-MCNC: 2 MG/DL (ref 0.6–1.6)
DIFFERENTIAL TYPE: ABNORMAL
EOSINOPHIL %: 6.4 % (ref 0–10)
EOSINOPHIL ABSOLUTE #: 0.7 10*3/UL (ref 0–0.5)
GFR SERPL CREATININE-BSD FRML MDRD: 32 ML/MIN/{1.73M2}
GFR SERPL CREATININE-BSD FRML MDRD: 39 ML/MIN/{1.73M2}
GLUCOSE P FAST SERPL-MCNC: 136 MG/DL (ref 60–100)
HEMATOCRIT: 46.1 % (ref 40–51)
HEMOGLOBIN: 14.9 G/DL (ref 13.7–17.5)
IMMATURE GRANULOCYTE ABSOLUTE: 0.03 10*3/UL (ref 0–0.05)
IMMATURE GRANULOCYTE PERCENT: 0.3 % (ref 0–0.5)
LYMPH PERCENT: 20.4 % (ref 20.5–51.1)
LYMPHOCYTE ABSOLUTE #: 2.1 10*3/UL (ref 1.2–3.4)
MEAN CORPUSCULAR HGB CONCENTRATION: 32.3 % (ref 32–36)
MEAN CORPUSCULAR HGB: 29.7 PG (ref 27–34)
MEAN CORPUSCULAR VOLUME: 92 FL (ref 79–95)
MEAN PLATELET VOLUME: 10.5 FL (ref 8.6–12.4)
MONOCYTE ABSOLUTE #: 0.9 10*3/UL (ref 0.2–0.9)
MONOCYTE PERCENT: 8.5 % (ref 4.3–12.9)
NEUTROPHIL ABSOLUTE #: 6.7 10*3/UL (ref 1.4–6.5)
NEUTROPHIL PERCENT: 63.4 % (ref 34–73.5)
PLATELET COUNT: 262 10*3/UL (ref 150–400)
POTASSIUM SERPL-SCNC: 5 MMOL/L (ref 3.5–5.3)
PROT SERPL-MCNC: 7.3 G/DL (ref 6.4–8.5)
RED BLOOD CELL COUNT: 5.01 10*6/UL (ref 3.9–5.7)
RED CELL DISTRIBUTION WIDTH: 14.6 % (ref 11.3–14.8)
SODIUM SERPL-SCNC: 138 MMOL/L (ref 136–146)
WHITE BLOOD CELL COUNT: 10.5 10*3/UL (ref 4–10)

## 2021-04-30 PROCEDURE — 85025 COMPLETE CBC W/AUTO DIFF WBC: CPT | Performed by: PATHOLOGY

## 2021-04-30 PROCEDURE — 36415 COLL VENOUS BLD VENIPUNCTURE: CPT | Performed by: PATHOLOGY

## 2021-04-30 PROCEDURE — 80053 COMPREHEN METABOLIC PANEL: CPT | Performed by: PATHOLOGY

## 2021-04-30 PROCEDURE — 86334 IMMUNOFIX E-PHORESIS SERUM: CPT | Performed by: CLINICAL MEDICAL LABORATORY

## 2021-04-30 PROCEDURE — 82784 ASSAY IGA/IGD/IGG/IGM EACH: CPT | Performed by: CLINICAL MEDICAL LABORATORY

## 2021-04-30 PROCEDURE — 83520 IMMUNOASSAY QUANT NOS NONAB: CPT | Performed by: CLINICAL MEDICAL LABORATORY

## 2021-05-03 LAB
IGA SERPL-MCNC: 235 MG/DL (ref 82–453)
IGA SERPL-MCNC: 235 MG/DL (ref 82–453)
IGG SERPL-MCNC: 1220 MG/DL
IGG SERPL-MCNC: 1220 MG/DL (ref 751–1560)
IGM SERPL-MCNC: 62 MG/DL (ref 46–304)
IGM SERPL-MCNC: 62 MG/DL (ref 46–304)
KAPPA LC FREE SER NEPH-MCNC: 15.82 MG/DL (ref 0.33–1.94)
KAPPA LC FREE SER-MCNC: 15.82 MG/DL (ref 0.33–1.94)
KAPPA LC/LAMBDA SER: 4.65 {RATIO} (ref 0.26–1.65)
KAPPA LC/LAMBDA SER: 4.65 {RATIO} (ref 0.26–1.65)
LAMBDA LC FREE SER NEPH-MCNC: 3.4 MG/DL (ref 0.57–2.63)
LAMBDA LC FREE SER NEPH-MCNC: 3.4 MG/DL (ref 0.57–2.63)
PATH INTERP BLD-IMP: ABNORMAL

## 2021-05-04 LAB — FAX RESULTS: NORMAL

## 2021-05-05 ENCOUNTER — OFFICE VISIT (OUTPATIENT)
Dept: INTERNAL MEDICINE | Age: 81
End: 2021-05-05

## 2021-05-05 VITALS
HEIGHT: 69 IN | WEIGHT: 191 LBS | HEART RATE: 76 BPM | RESPIRATION RATE: 18 BRPM | SYSTOLIC BLOOD PRESSURE: 120 MMHG | BODY MASS INDEX: 28.29 KG/M2 | DIASTOLIC BLOOD PRESSURE: 70 MMHG | TEMPERATURE: 97 F

## 2021-05-05 DIAGNOSIS — M35.3 PMR (POLYMYALGIA RHEUMATICA) (CMD): ICD-10-CM

## 2021-05-05 DIAGNOSIS — Z00.00 MEDICARE ANNUAL WELLNESS VISIT, SUBSEQUENT: Primary | ICD-10-CM

## 2021-05-05 DIAGNOSIS — I82.513 CHRONIC EMBOLISM AND THROMBOSIS OF BOTH FEMORAL VEINS (CMD): ICD-10-CM

## 2021-05-05 DIAGNOSIS — N18.30 STAGE 3 CHRONIC KIDNEY DISEASE, UNSPECIFIED WHETHER STAGE 3A OR 3B CKD (CMD): ICD-10-CM

## 2021-05-05 DIAGNOSIS — E11.42 TYPE 2 DIABETES MELLITUS WITH DIABETIC POLYNEUROPATHY, WITHOUT LONG-TERM CURRENT USE OF INSULIN (CMD): ICD-10-CM

## 2021-05-05 PROCEDURE — G0439 PPPS, SUBSEQ VISIT: HCPCS | Performed by: INTERNAL MEDICINE

## 2021-05-05 PROCEDURE — 99214 OFFICE O/P EST MOD 30 MIN: CPT | Performed by: INTERNAL MEDICINE

## 2021-05-05 RX ORDER — PROCHLORPERAZINE 25 MG/1
SUPPOSITORY RECTAL
COMMUNITY
Start: 2020-10-15

## 2021-05-05 RX ORDER — INSULIN ASPART 100 [IU]/ML
INJECTION, SOLUTION INTRAVENOUS; SUBCUTANEOUS
COMMUNITY
End: 2022-04-25 | Stop reason: ALTCHOICE

## 2021-05-05 RX ORDER — MAGNESIUM CARB/ALUMINUM HYDROX 105-160MG
30 TABLET,CHEWABLE ORAL DAILY PRN
COMMUNITY

## 2021-05-05 ASSESSMENT — PATIENT HEALTH QUESTIONNAIRE - PHQ9
CLINICAL INTERPRETATION OF PHQ9 SCORE: NO FURTHER SCREENING NEEDED
1. LITTLE INTEREST OR PLEASURE IN DOING THINGS: NOT AT ALL
CLINICAL INTERPRETATION OF PHQ2 SCORE: NO FURTHER SCREENING NEEDED
2. FEELING DOWN, DEPRESSED OR HOPELESS: NOT AT ALL
SUM OF ALL RESPONSES TO PHQ9 QUESTIONS 1 AND 2: 0
SUM OF ALL RESPONSES TO PHQ9 QUESTIONS 1 AND 2: 0

## 2021-05-06 ENCOUNTER — PATIENT MESSAGE (OUTPATIENT)
Dept: ENDOCRINOLOGY | Age: 81
End: 2021-05-06

## 2021-05-10 ENCOUNTER — EXTERNAL RECORD (OUTPATIENT)
Dept: HEALTH INFORMATION MANAGEMENT | Facility: OTHER | Age: 81
End: 2021-05-10

## 2021-05-11 ENCOUNTER — OFFICE VISIT (OUTPATIENT)
Dept: ENDOCRINOLOGY | Age: 81
End: 2021-05-11

## 2021-05-11 DIAGNOSIS — E66.3 OVERWEIGHT: ICD-10-CM

## 2021-05-11 DIAGNOSIS — E11.65 TYPE 2 DIABETES MELLITUS WITH HYPERGLYCEMIA, WITH LONG-TERM CURRENT USE OF INSULIN (CMD): Primary | ICD-10-CM

## 2021-05-11 DIAGNOSIS — I10 ESSENTIAL HYPERTENSION: ICD-10-CM

## 2021-05-11 DIAGNOSIS — Z79.4 TYPE 2 DIABETES MELLITUS WITH HYPERGLYCEMIA, WITH LONG-TERM CURRENT USE OF INSULIN (CMD): Primary | ICD-10-CM

## 2021-05-11 PROCEDURE — 99204 OFFICE O/P NEW MOD 45 MIN: CPT | Performed by: INTERNAL MEDICINE

## 2021-05-11 ASSESSMENT — PATIENT HEALTH QUESTIONNAIRE - PHQ9
2. FEELING DOWN, DEPRESSED OR HOPELESS: NOT AT ALL
SUM OF ALL RESPONSES TO PHQ9 QUESTIONS 1 AND 2: 0
CLINICAL INTERPRETATION OF PHQ2 SCORE: NO FURTHER SCREENING NEEDED
1. LITTLE INTEREST OR PLEASURE IN DOING THINGS: NOT AT ALL
SUM OF ALL RESPONSES TO PHQ9 QUESTIONS 1 AND 2: 0
CLINICAL INTERPRETATION OF PHQ9 SCORE: NO FURTHER SCREENING NEEDED

## 2021-05-11 ASSESSMENT — PAIN SCALES - GENERAL: PAINLEVEL: 0

## 2021-05-25 VITALS
RESPIRATION RATE: 16 BRPM | RESPIRATION RATE: 16 BRPM | HEART RATE: 67 BPM | BODY MASS INDEX: 29.33 KG/M2 | RESPIRATION RATE: 18 BRPM | HEART RATE: 80 BPM | OXYGEN SATURATION: 97 % | DIASTOLIC BLOOD PRESSURE: 72 MMHG | TEMPERATURE: 99.7 F | BODY MASS INDEX: 28.14 KG/M2 | WEIGHT: 198 LBS | WEIGHT: 194 LBS | TEMPERATURE: 97.9 F | WEIGHT: 180 LBS | WEIGHT: 190 LBS | BODY MASS INDEX: 26.66 KG/M2 | SYSTOLIC BLOOD PRESSURE: 114 MMHG | DIASTOLIC BLOOD PRESSURE: 60 MMHG | HEART RATE: 96 BPM | DIASTOLIC BLOOD PRESSURE: 72 MMHG | HEART RATE: 88 BPM | DIASTOLIC BLOOD PRESSURE: 78 MMHG | BODY MASS INDEX: 28.73 KG/M2 | TEMPERATURE: 97.6 F | DIASTOLIC BLOOD PRESSURE: 66 MMHG | TEMPERATURE: 97.4 F | RESPIRATION RATE: 16 BRPM | WEIGHT: 197 LBS | SYSTOLIC BLOOD PRESSURE: 126 MMHG | SYSTOLIC BLOOD PRESSURE: 104 MMHG | TEMPERATURE: 97.6 F | HEIGHT: 69 IN | HEART RATE: 64 BPM | HEIGHT: 69 IN | BODY MASS INDEX: 29.18 KG/M2 | SYSTOLIC BLOOD PRESSURE: 128 MMHG | SYSTOLIC BLOOD PRESSURE: 132 MMHG | HEIGHT: 69 IN | HEIGHT: 69 IN | HEIGHT: 69 IN

## 2021-05-28 ENCOUNTER — OFFICE VISIT (OUTPATIENT)
Dept: UROLOGY | Age: 81
End: 2021-05-28

## 2021-05-28 ENCOUNTER — LAB SERVICES (OUTPATIENT)
Dept: LAB | Age: 81
End: 2021-05-28

## 2021-05-28 VITALS — WEIGHT: 193 LBS | TEMPERATURE: 97.9 F | BODY MASS INDEX: 28.58 KG/M2 | HEIGHT: 69 IN

## 2021-05-28 DIAGNOSIS — Z12.5 PROSTATE CANCER SCREENING: ICD-10-CM

## 2021-05-28 DIAGNOSIS — Z85.51 PERSONAL HISTORY OF BLADDER CANCER: ICD-10-CM

## 2021-05-28 DIAGNOSIS — N40.1 BENIGN PROSTATIC HYPERPLASIA WITH URINARY FREQUENCY: Primary | ICD-10-CM

## 2021-05-28 DIAGNOSIS — R35.0 BENIGN PROSTATIC HYPERPLASIA WITH URINARY FREQUENCY: Primary | ICD-10-CM

## 2021-05-28 LAB
BLDR SCAN MLS: NORMAL
PSA SERPL-MCNC: 1.57 NG/ML (ref 0–7)

## 2021-05-28 PROCEDURE — 51798 US URINE CAPACITY MEASURE: CPT | Performed by: UROLOGY

## 2021-05-28 PROCEDURE — 36415 COLL VENOUS BLD VENIPUNCTURE: CPT | Performed by: UROLOGY

## 2021-05-28 PROCEDURE — 99213 OFFICE O/P EST LOW 20 MIN: CPT | Performed by: UROLOGY

## 2021-05-28 PROCEDURE — G0103 PSA SCREENING: HCPCS | Performed by: UROLOGY

## 2021-06-08 ENCOUNTER — OFFICE VISIT (OUTPATIENT)
Dept: ENDOCRINOLOGY | Age: 81
End: 2021-06-08

## 2021-06-08 VITALS
SYSTOLIC BLOOD PRESSURE: 122 MMHG | HEART RATE: 70 BPM | OXYGEN SATURATION: 97 % | HEIGHT: 69 IN | DIASTOLIC BLOOD PRESSURE: 68 MMHG | TEMPERATURE: 96.8 F | WEIGHT: 199 LBS | BODY MASS INDEX: 29.47 KG/M2

## 2021-06-08 DIAGNOSIS — E78.5 DYSLIPIDEMIA, GOAL LDL BELOW 100: ICD-10-CM

## 2021-06-08 DIAGNOSIS — I10 ESSENTIAL HYPERTENSION: ICD-10-CM

## 2021-06-08 DIAGNOSIS — E66.3 OVERWEIGHT: ICD-10-CM

## 2021-06-08 DIAGNOSIS — Z79.4 TYPE 2 DIABETES MELLITUS WITH HYPERGLYCEMIA, WITH LONG-TERM CURRENT USE OF INSULIN (CMD): Primary | ICD-10-CM

## 2021-06-08 DIAGNOSIS — E11.65 TYPE 2 DIABETES MELLITUS WITH HYPERGLYCEMIA, WITH LONG-TERM CURRENT USE OF INSULIN (CMD): Primary | ICD-10-CM

## 2021-06-08 PROCEDURE — 95251 CONT GLUC MNTR ANALYSIS I&R: CPT | Performed by: INTERNAL MEDICINE

## 2021-06-08 PROCEDURE — 99214 OFFICE O/P EST MOD 30 MIN: CPT | Performed by: INTERNAL MEDICINE

## 2021-06-08 ASSESSMENT — PAIN SCALES - GENERAL: PAINLEVEL: 0

## 2021-06-15 ENCOUNTER — APPOINTMENT (OUTPATIENT)
Dept: OPHTHALMOLOGY | Age: 81
End: 2021-06-15

## 2021-07-16 ENCOUNTER — OFFICE VISIT (OUTPATIENT)
Dept: UROLOGY | Age: 81
End: 2021-07-16

## 2021-07-16 DIAGNOSIS — N40.1 BENIGN PROSTATIC HYPERPLASIA WITH WEAK URINARY STREAM: Primary | ICD-10-CM

## 2021-07-16 DIAGNOSIS — R39.12 BENIGN PROSTATIC HYPERPLASIA WITH WEAK URINARY STREAM: Primary | ICD-10-CM

## 2021-07-16 PROCEDURE — 52000 CYSTOURETHROSCOPY: CPT | Performed by: UROLOGY

## 2021-07-16 PROCEDURE — 76872 US TRANSRECTAL: CPT | Performed by: UROLOGY

## 2021-07-16 RX ORDER — SULFAMETHOXAZOLE AND TRIMETHOPRIM 800; 160 MG/1; MG/1
1 TABLET ORAL 2 TIMES DAILY
Qty: 2 TABLET | Refills: 0 | Status: SHIPPED | OUTPATIENT
Start: 2021-07-16 | End: 2021-07-17

## 2021-07-19 ENCOUNTER — TELEPHONE (OUTPATIENT)
Dept: UROLOGY | Age: 81
End: 2021-07-19

## 2021-07-19 DIAGNOSIS — R39.12 BENIGN PROSTATIC HYPERPLASIA WITH WEAK URINARY STREAM: Primary | ICD-10-CM

## 2021-07-19 DIAGNOSIS — Z01.818 PREOPERATIVE TESTING: ICD-10-CM

## 2021-07-19 DIAGNOSIS — N40.1 BENIGN PROSTATIC HYPERPLASIA WITH WEAK URINARY STREAM: Primary | ICD-10-CM

## 2021-07-20 DIAGNOSIS — N40.1 BENIGN PROSTATIC HYPERPLASIA WITH WEAK URINARY STREAM: ICD-10-CM

## 2021-07-20 DIAGNOSIS — R39.12 BENIGN PROSTATIC HYPERPLASIA WITH WEAK URINARY STREAM: ICD-10-CM

## 2021-07-20 DIAGNOSIS — Z01.818 PREOP EXAMINATION: Primary | ICD-10-CM

## 2021-07-27 ENCOUNTER — LAB REQUISITION (OUTPATIENT)
Dept: LAB | Age: 81
End: 2021-07-27

## 2021-07-27 ENCOUNTER — LAB SERVICES (OUTPATIENT)
Dept: FAMILY MEDICINE | Age: 81
End: 2021-07-27

## 2021-07-27 DIAGNOSIS — D47.2 MONOCLONAL GAMMOPATHY: ICD-10-CM

## 2021-07-27 DIAGNOSIS — Z01.818 PREOPERATIVE TESTING: ICD-10-CM

## 2021-07-27 DIAGNOSIS — N40.1 BENIGN PROSTATIC HYPERPLASIA WITH WEAK URINARY STREAM: ICD-10-CM

## 2021-07-27 DIAGNOSIS — E11.42 TYPE 2 DIABETES MELLITUS WITH DIABETIC POLYNEUROPATHY, WITHOUT LONG-TERM CURRENT USE OF INSULIN (CMD): ICD-10-CM

## 2021-07-27 DIAGNOSIS — R39.12 BENIGN PROSTATIC HYPERPLASIA WITH WEAK URINARY STREAM: ICD-10-CM

## 2021-07-27 LAB
ALBUMIN SERPL-MCNC: 4.2 G/DL (ref 3.6–5.1)
ALP SERPL-CCNC: 78 U/L (ref 45–115)
ALT SERPL W/O P-5'-P-CCNC: 13 U/L (ref 5–49)
AST SERPL-CCNC: 19 U/L (ref 14–43)
BASOPHIL %: 0.9 % (ref 0–1.2)
BASOPHIL ABSOLUTE #: 0.1 10*3/UL (ref 0–0.1)
BILIRUB SERPL-MCNC: 0.5 MG/DL (ref 0–1.3)
BILIRUBIN URINE: NEGATIVE
BLOOD URINE: NEGATIVE
BUN SERPL-MCNC: 33 MG/DL (ref 6–27)
CALCIUM SERPL-MCNC: 9.4 MG/DL (ref 8.6–10.6)
CHLORIDE SERPL-SCNC: 104 MMOL/L (ref 96–107)
CHOLEST SERPL-MCNC: 206 MG/DL (ref 140–200)
CLARITY: CLEAR
CO2 SERPL-SCNC: 24 MMOL/L (ref 22–32)
COLOR: YELLOW
CREAT SERPL-MCNC: 1.9 MG/DL (ref 0.6–1.6)
DIFFERENTIAL TYPE: ABNORMAL
EOSINOPHIL %: 4.1 % (ref 0–10)
EOSINOPHIL ABSOLUTE #: 0.5 10*3/UL (ref 0–0.5)
EST. AVERAGE GLUCOSE BLD GHB EST-MCNC: 168 MG/DL (ref 0–154)
GFR SERPL CREATININE-BSD FRML MDRD: 34 ML/MIN/{1.73M2}
GFR SERPL CREATININE-BSD FRML MDRD: 42 ML/MIN/{1.73M2}
GLUCOSE P FAST SERPL-MCNC: 125 MG/DL (ref 60–100)
GLUCOSE QUALITATIVE U: NEGATIVE
HBA1C MFR BLD: 7.5 % (ref 4.2–6)
HDLC SERPL-MCNC: 42 MG/DL
HEMATOCRIT: 45.7 % (ref 40–51)
HEMOGLOBIN: 14.6 G/DL (ref 13.7–17.5)
IMMATURE GRANULOCYTE ABSOLUTE: 0.03 10*3/UL (ref 0–0.05)
IMMATURE GRANULOCYTE PERCENT: 0.3 % (ref 0–0.5)
KETONES, URINE: NEGATIVE
LDLC SERPL CALC-MCNC: 128 MG/DL (ref 30–100)
LEUKOCYTE ESTERASE URINE: NEGATIVE
LYMPH PERCENT: 15.4 % (ref 20.5–51.1)
LYMPHOCYTE ABSOLUTE #: 1.8 10*3/UL (ref 1.2–3.4)
MEAN CORPUSCULAR HGB CONCENTRATION: 31.9 % (ref 32–36)
MEAN CORPUSCULAR HGB: 29.6 PG (ref 27–34)
MEAN CORPUSCULAR VOLUME: 92.7 FL (ref 79–95)
MEAN PLATELET VOLUME: 10.7 FL (ref 8.6–12.4)
MONOCYTE ABSOLUTE #: 1 10*3/UL (ref 0.2–0.9)
MONOCYTE PERCENT: 8.3 % (ref 4.3–12.9)
MUCOUS: NORMAL
NEUTROPHIL ABSOLUTE #: 8.1 10*3/UL (ref 1.4–6.5)
NEUTROPHIL PERCENT: 71 % (ref 34–73.5)
NITRITE URINE: NEGATIVE
PH URINE: 5 (ref 5–7)
PLATELET COUNT: 255 10*3/UL (ref 150–400)
POTASSIUM SERPL-SCNC: 5.1 MMOL/L (ref 3.5–5.3)
PROT SERPL-MCNC: 7.2 G/DL (ref 6.4–8.5)
RED BLOOD CELL COUNT: 4.93 10*6/UL (ref 3.9–5.7)
RED BLOOD CELLS URINE: NORMAL (ref 0–3)
RED CELL DISTRIBUTION WIDTH: 13.9 % (ref 11.3–14.8)
SODIUM SERPL-SCNC: 138 MMOL/L (ref 136–146)
SPECIFIC GRAVITY URINE: 1.01 (ref 1–1.03)
SQUAMOUS EPITHELIAL CELLS: 0
TRIGL SERPL-MCNC: 180 MG/DL (ref 0–200)
URINE PROTEIN, QUAL (DIPSTICK): 100
UROBILINOGEN URINE: <2
WHITE BLOOD CELL COUNT: 11.4 10*3/UL (ref 4–10)
WHITE BLOOD CELLS URINE: NORMAL (ref 0–5)

## 2021-07-27 PROCEDURE — 82784 ASSAY IGA/IGD/IGG/IGM EACH: CPT | Performed by: CLINICAL MEDICAL LABORATORY

## 2021-07-27 PROCEDURE — 80053 COMPREHEN METABOLIC PANEL: CPT | Performed by: UROLOGY

## 2021-07-27 PROCEDURE — 36415 COLL VENOUS BLD VENIPUNCTURE: CPT | Performed by: PATHOLOGY

## 2021-07-27 PROCEDURE — 85025 COMPLETE CBC W/AUTO DIFF WBC: CPT | Performed by: PATHOLOGY

## 2021-07-27 PROCEDURE — 83520 IMMUNOASSAY QUANT NOS NONAB: CPT | Performed by: CLINICAL MEDICAL LABORATORY

## 2021-07-27 PROCEDURE — 83036 HEMOGLOBIN GLYCOSYLATED A1C: CPT | Performed by: UROLOGY

## 2021-07-27 PROCEDURE — 87086 URINE CULTURE/COLONY COUNT: CPT | Performed by: UROLOGY

## 2021-07-27 PROCEDURE — 80061 LIPID PANEL: CPT | Performed by: UROLOGY

## 2021-07-27 PROCEDURE — 86334 IMMUNOFIX E-PHORESIS SERUM: CPT | Performed by: CLINICAL MEDICAL LABORATORY

## 2021-07-27 PROCEDURE — 81003 URINALYSIS AUTO W/O SCOPE: CPT | Performed by: UROLOGY

## 2021-07-28 LAB — FINAL REPORT: NORMAL

## 2021-07-29 LAB
IGA SERPL-MCNC: 238 MG/DL (ref 82–453)
IGA SERPL-MCNC: 238 MG/DL (ref 82–453)
IGG SERPL-MCNC: 1280 MG/DL
IGG SERPL-MCNC: 1280 MG/DL (ref 751–1560)
IGM SERPL-MCNC: 58 MG/DL (ref 46–304)
IGM SERPL-MCNC: 58 MG/DL (ref 46–304)
KAPPA LC FREE SER NEPH-MCNC: 16.3 MG/DL (ref 0.33–1.94)
KAPPA LC FREE SER-MCNC: 16.3 MG/DL (ref 0.33–1.94)
KAPPA LC/LAMBDA SER: 4.55 {RATIO} (ref 0.26–1.65)
KAPPA LC/LAMBDA SER: 4.55 {RATIO} (ref 0.26–1.65)
LAMBDA LC FREE SER NEPH-MCNC: 3.58 MG/DL (ref 0.57–2.63)
LAMBDA LC FREE SER NEPH-MCNC: 3.58 MG/DL (ref 0.57–2.63)
PATH INTERP BLD-IMP: ABNORMAL

## 2021-07-30 ENCOUNTER — OFFICE VISIT (OUTPATIENT)
Dept: INTERNAL MEDICINE | Age: 81
End: 2021-07-30

## 2021-07-30 ENCOUNTER — IMAGING SERVICES (OUTPATIENT)
Dept: GENERAL RADIOLOGY | Age: 81
End: 2021-07-30
Attending: INTERNAL MEDICINE

## 2021-07-30 VITALS
HEART RATE: 76 BPM | WEIGHT: 204.2 LBS | TEMPERATURE: 98 F | SYSTOLIC BLOOD PRESSURE: 126 MMHG | RESPIRATION RATE: 16 BRPM | BODY MASS INDEX: 30.24 KG/M2 | HEIGHT: 69 IN | DIASTOLIC BLOOD PRESSURE: 78 MMHG

## 2021-07-30 DIAGNOSIS — Z01.818 PREOP EXAMINATION: ICD-10-CM

## 2021-07-30 DIAGNOSIS — N40.1 BENIGN PROSTATIC HYPERPLASIA WITH WEAK URINARY STREAM: ICD-10-CM

## 2021-07-30 DIAGNOSIS — R39.12 BENIGN PROSTATIC HYPERPLASIA WITH WEAK URINARY STREAM: ICD-10-CM

## 2021-07-30 DIAGNOSIS — R91.1 PULMONARY NODULE: Primary | ICD-10-CM

## 2021-07-30 PROCEDURE — 99214 OFFICE O/P EST MOD 30 MIN: CPT | Performed by: INTERNAL MEDICINE

## 2021-07-30 PROCEDURE — 71046 X-RAY EXAM CHEST 2 VIEWS: CPT | Performed by: RADIOLOGY

## 2021-07-30 PROCEDURE — 93000 ELECTROCARDIOGRAM COMPLETE: CPT | Performed by: INTERNAL MEDICINE

## 2021-07-30 ASSESSMENT — ENCOUNTER SYMPTOMS
ALLERGIC/IMMUNOLOGIC NEGATIVE: 1
GASTROINTESTINAL NEGATIVE: 1
NEUROLOGICAL NEGATIVE: 1
RESPIRATORY NEGATIVE: 1
CONSTITUTIONAL NEGATIVE: 1
EYES NEGATIVE: 1
ENDOCRINE NEGATIVE: 1
PSYCHIATRIC NEGATIVE: 1
HEMATOLOGIC/LYMPHATIC NEGATIVE: 1

## 2021-08-02 ENCOUNTER — IMAGING SERVICES (OUTPATIENT)
Dept: CT IMAGING | Age: 81
End: 2021-08-02
Attending: INTERNAL MEDICINE

## 2021-08-02 DIAGNOSIS — R91.1 PULMONARY NODULE: ICD-10-CM

## 2021-08-02 PROCEDURE — 71250 CT THORAX DX C-: CPT | Performed by: RADIOLOGY

## 2021-08-02 PROCEDURE — G1004 CDSM NDSC: HCPCS | Performed by: RADIOLOGY

## 2021-08-05 ENCOUNTER — TELEPHONE (OUTPATIENT)
Dept: PULMONOLOGY | Age: 81
End: 2021-08-05

## 2021-08-05 DIAGNOSIS — R91.8 LUNG NODULES: ICD-10-CM

## 2021-08-05 DIAGNOSIS — L72.9 BENIGN CYST OF SKIN: Primary | ICD-10-CM

## 2021-08-05 DIAGNOSIS — E04.1 BENIGN THYROID CYST: ICD-10-CM

## 2021-08-06 ENCOUNTER — TELEPHONE (OUTPATIENT)
Dept: UROLOGY | Age: 81
End: 2021-08-06

## 2021-08-06 LAB — FAX RESULTS: NORMAL

## 2021-08-09 ENCOUNTER — TELEPHONE (OUTPATIENT)
Dept: UROLOGY | Age: 81
End: 2021-08-09

## 2021-08-10 ENCOUNTER — APPOINTMENT (OUTPATIENT)
Dept: OTHER | Facility: PHYSICIAN GROUP | Age: 81
End: 2021-08-10
Attending: UROLOGY

## 2021-08-10 ENCOUNTER — EXTERNAL RECORD (OUTPATIENT)
Dept: UROLOGY | Age: 81
End: 2021-08-10

## 2021-08-10 LAB — BEDSIDE GLUCOSE: 97 MG/DL (ref 70–110)

## 2021-08-10 PROCEDURE — 52442 CYSTO INS TRNSPRSTC IMPLT EA: CPT | Performed by: UROLOGY

## 2021-08-10 PROCEDURE — 52441 CYSTO INSJ TRNSPRSTC 1 IMPLT: CPT | Performed by: UROLOGY

## 2021-08-11 ENCOUNTER — NURSE ONLY (OUTPATIENT)
Dept: UROLOGY | Age: 81
End: 2021-08-11

## 2021-08-11 ENCOUNTER — APPOINTMENT (OUTPATIENT)
Dept: PULMONOLOGY | Age: 81
End: 2021-08-11

## 2021-08-11 ENCOUNTER — OFFICE VISIT (OUTPATIENT)
Dept: PULMONOLOGY | Age: 81
End: 2021-08-11

## 2021-08-11 VITALS
WEIGHT: 196 LBS | TEMPERATURE: 98 F | OXYGEN SATURATION: 96 % | RESPIRATION RATE: 16 BRPM | BODY MASS INDEX: 28.06 KG/M2 | HEIGHT: 70 IN | SYSTOLIC BLOOD PRESSURE: 120 MMHG | DIASTOLIC BLOOD PRESSURE: 62 MMHG | HEART RATE: 77 BPM

## 2021-08-11 DIAGNOSIS — R39.12 BENIGN PROSTATIC HYPERPLASIA WITH WEAK URINARY STREAM: Primary | ICD-10-CM

## 2021-08-11 DIAGNOSIS — R91.1 SOLITARY PULMONARY NODULE: Primary | ICD-10-CM

## 2021-08-11 DIAGNOSIS — N40.1 BENIGN PROSTATIC HYPERPLASIA WITH WEAK URINARY STREAM: Primary | ICD-10-CM

## 2021-08-11 PROCEDURE — X1094 NO CHARGE VISIT: HCPCS | Performed by: UROLOGY

## 2021-08-11 PROCEDURE — 99204 OFFICE O/P NEW MOD 45 MIN: CPT | Performed by: INTERNAL MEDICINE

## 2021-08-11 RX ORDER — LEVOFLOXACIN 500 MG/1
500 TABLET, FILM COATED ORAL EVERY 24 HOURS
COMMUNITY
Start: 2021-08-10 | End: 2022-04-25 | Stop reason: ALTCHOICE

## 2021-08-11 ASSESSMENT — PAIN SCALES - GENERAL: PAINLEVEL: 0

## 2021-08-12 ENCOUNTER — APPOINTMENT (OUTPATIENT)
Dept: PULMONOLOGY | Age: 81
End: 2021-08-12

## 2021-08-12 ENCOUNTER — IMAGING SERVICES (OUTPATIENT)
Dept: ULTRASOUND IMAGING | Age: 81
End: 2021-08-12
Attending: INTERNAL MEDICINE

## 2021-08-12 DIAGNOSIS — E04.1 BENIGN THYROID CYST: ICD-10-CM

## 2021-08-12 PROCEDURE — 76536 US EXAM OF HEAD AND NECK: CPT | Performed by: RADIOLOGY

## 2021-08-13 DIAGNOSIS — E04.2 MULTIPLE THYROID NODULES: Primary | ICD-10-CM

## 2021-08-14 ENCOUNTER — TELEPHONE (OUTPATIENT)
Dept: FAMILY MEDICINE | Age: 81
End: 2021-08-14

## 2021-08-14 DIAGNOSIS — E04.2 MULTIPLE THYROID NODULES: Primary | ICD-10-CM

## 2021-08-25 RX ORDER — MONTELUKAST SODIUM 10 MG/1
TABLET ORAL
Qty: 90 TABLET | Refills: 3 | Status: SHIPPED | OUTPATIENT
Start: 2021-08-25 | End: 2022-09-06

## 2021-08-26 ENCOUNTER — TELEPHONE (OUTPATIENT)
Dept: OTOLARYNGOLOGY | Age: 81
End: 2021-08-26

## 2021-08-26 ENCOUNTER — OFFICE VISIT (OUTPATIENT)
Dept: OTOLARYNGOLOGY | Age: 81
End: 2021-08-26

## 2021-08-26 VITALS — WEIGHT: 200 LBS | HEIGHT: 69 IN | BODY MASS INDEX: 29.62 KG/M2

## 2021-08-26 DIAGNOSIS — H61.23 BILATERAL IMPACTED CERUMEN: Primary | ICD-10-CM

## 2021-08-26 DIAGNOSIS — H62.40 FUNGAL OTITIS EXTERNA: ICD-10-CM

## 2021-08-26 DIAGNOSIS — B36.9 FUNGAL OTITIS EXTERNA: ICD-10-CM

## 2021-08-26 PROCEDURE — 69210 REMOVE IMPACTED EAR WAX UNI: CPT | Performed by: OTOLARYNGOLOGY

## 2021-08-26 PROCEDURE — 99214 OFFICE O/P EST MOD 30 MIN: CPT | Performed by: OTOLARYNGOLOGY

## 2021-08-26 RX ORDER — CIPROFLOXACIN AND DEXAMETHASONE 3; 1 MG/ML; MG/ML
4 SUSPENSION/ DROPS AURICULAR (OTIC) 2 TIMES DAILY
Qty: 7.5 ML | Refills: 0 | Status: SHIPPED | OUTPATIENT
Start: 2021-08-26 | End: 2021-09-02

## 2021-08-26 RX ORDER — CLOTRIMAZOLE 1 G/ML
SOLUTION TOPICAL
Qty: 10 ML | Refills: 0 | Status: SHIPPED | OUTPATIENT
Start: 2021-08-26 | End: 2022-04-25 | Stop reason: ALTCHOICE

## 2021-08-26 RX ORDER — FLUCONAZOLE 200 MG/1
200 TABLET ORAL DAILY
Qty: 7 TABLET | Refills: 0 | Status: SHIPPED | OUTPATIENT
Start: 2021-08-26 | End: 2021-09-02

## 2021-08-26 RX ORDER — FLUCONAZOLE 150 MG/1
150 TABLET ORAL ONCE
Qty: 1 TABLET | Refills: 1 | Status: CANCELLED | OUTPATIENT
Start: 2021-08-26 | End: 2021-08-26

## 2021-08-27 ENCOUNTER — TELEPHONE (OUTPATIENT)
Dept: OTOLARYNGOLOGY | Age: 81
End: 2021-08-27

## 2021-08-27 RX ORDER — PREDNISOLONE ACETATE 10 MG/ML
SUSPENSION/ DROPS OPHTHALMIC
Qty: 15 ML | Refills: 0 | Status: SHIPPED | OUTPATIENT
Start: 2021-08-27 | End: 2022-04-25 | Stop reason: ALTCHOICE

## 2021-08-27 RX ORDER — OFLOXACIN 3 MG/ML
4 SOLUTION AURICULAR (OTIC) 2 TIMES DAILY
Qty: 10 ML | Refills: 0 | Status: SHIPPED | OUTPATIENT
Start: 2021-08-27 | End: 2021-09-15 | Stop reason: SDUPTHER

## 2021-08-30 DIAGNOSIS — E04.2 MULTIPLE THYROID NODULES: ICD-10-CM

## 2021-08-31 ENCOUNTER — OFFICE VISIT (OUTPATIENT)
Dept: UROLOGY | Age: 81
End: 2021-08-31

## 2021-08-31 VITALS — HEIGHT: 69 IN | BODY MASS INDEX: 28.73 KG/M2 | WEIGHT: 194 LBS | TEMPERATURE: 98.2 F

## 2021-08-31 DIAGNOSIS — R39.12 BENIGN PROSTATIC HYPERPLASIA WITH WEAK URINARY STREAM: Primary | ICD-10-CM

## 2021-08-31 DIAGNOSIS — N40.1 BENIGN PROSTATIC HYPERPLASIA WITH WEAK URINARY STREAM: Primary | ICD-10-CM

## 2021-08-31 LAB — BLDR SCAN MLS: 34

## 2021-08-31 PROCEDURE — 51798 US URINE CAPACITY MEASURE: CPT | Performed by: UROLOGY

## 2021-08-31 PROCEDURE — 99213 OFFICE O/P EST LOW 20 MIN: CPT | Performed by: UROLOGY

## 2021-09-01 ENCOUNTER — TELEPHONE (OUTPATIENT)
Dept: INTERNAL MEDICINE | Age: 81
End: 2021-09-01

## 2021-09-01 DIAGNOSIS — E04.2 MULTIPLE THYROID NODULES: Primary | ICD-10-CM

## 2021-09-15 ENCOUNTER — OFFICE VISIT (OUTPATIENT)
Dept: OTOLARYNGOLOGY | Age: 81
End: 2021-09-15

## 2021-09-15 VITALS — BODY MASS INDEX: 28.29 KG/M2 | HEIGHT: 69 IN | WEIGHT: 191 LBS

## 2021-09-15 DIAGNOSIS — H60.333 CHRONIC SWIMMER'S EAR OF BOTH SIDES: Primary | ICD-10-CM

## 2021-09-15 PROBLEM — N18.30 STAGE 3 CHRONIC KIDNEY DISEASE (CMD): Status: ACTIVE | Noted: 2021-09-15

## 2021-09-15 PROBLEM — N40.0 BENIGN PROSTATIC HYPERPLASIA: Status: ACTIVE | Noted: 2021-09-15

## 2021-09-15 PROBLEM — D72.9 NEUTROPHILIC LEUKOCYTOSIS: Status: ACTIVE | Noted: 2021-09-15

## 2021-09-15 PROBLEM — I82.513 CHRONIC BILATERAL DEEP VEIN THROMBOSIS (DVT) OF FEMORAL VEINS (CMD): Status: ACTIVE | Noted: 2021-09-15

## 2021-09-15 PROBLEM — R06.00 DYSPNEA: Status: ACTIVE | Noted: 2020-04-17

## 2021-09-15 PROBLEM — E87.1 HYPONATREMIA: Status: ACTIVE | Noted: 2020-04-17

## 2021-09-15 PROBLEM — M79.7 FIBROMYALGIA: Status: ACTIVE | Noted: 2021-09-15

## 2021-09-15 PROBLEM — K21.9 GASTROESOPHAGEAL REFLUX DISEASE: Status: ACTIVE | Noted: 2021-09-15

## 2021-09-15 PROBLEM — H54.7 VISION LOSS: Status: ACTIVE | Noted: 2020-05-01

## 2021-09-15 PROBLEM — D47.2 MGUS (MONOCLONAL GAMMOPATHY OF UNKNOWN SIGNIFICANCE): Status: ACTIVE | Noted: 2017-07-25

## 2021-09-15 PROBLEM — C90.00 MULTIPLE MYELOMA NOT HAVING ACHIEVED REMISSION (CMD): Status: ACTIVE | Noted: 2021-09-15

## 2021-09-15 PROCEDURE — 99214 OFFICE O/P EST MOD 30 MIN: CPT | Performed by: OTOLARYNGOLOGY

## 2021-09-15 PROCEDURE — 69210 REMOVE IMPACTED EAR WAX UNI: CPT | Performed by: OTOLARYNGOLOGY

## 2021-09-15 RX ORDER — MOMETASONE FUROATE 1 MG/G
CREAM TOPICAL
Qty: 15 G | Refills: 2 | Status: SHIPPED | OUTPATIENT
Start: 2021-09-15

## 2021-09-15 RX ORDER — OFLOXACIN 3 MG/ML
4 SOLUTION AURICULAR (OTIC) 2 TIMES DAILY
Qty: 10 ML | Refills: 0 | Status: SHIPPED | OUTPATIENT
Start: 2021-09-15 | End: 2022-04-25 | Stop reason: ALTCHOICE

## 2021-09-23 ENCOUNTER — APPOINTMENT (OUTPATIENT)
Dept: ULTRASOUND IMAGING | Age: 81
End: 2021-09-23
Attending: INTERNAL MEDICINE

## 2021-09-28 ENCOUNTER — IMAGING SERVICES (OUTPATIENT)
Dept: CT IMAGING | Age: 81
End: 2021-09-28
Attending: INTERNAL MEDICINE

## 2021-09-28 DIAGNOSIS — R91.1 SOLITARY PULMONARY NODULE: ICD-10-CM

## 2021-09-28 PROCEDURE — G1004 CDSM NDSC: HCPCS | Performed by: RADIOLOGY

## 2021-09-28 PROCEDURE — 71250 CT THORAX DX C-: CPT | Performed by: RADIOLOGY

## 2021-09-30 ENCOUNTER — TELEPHONE (OUTPATIENT)
Dept: OTOLARYNGOLOGY | Age: 81
End: 2021-09-30

## 2021-09-30 ENCOUNTER — OFFICE VISIT (OUTPATIENT)
Dept: OTOLARYNGOLOGY | Age: 81
End: 2021-09-30

## 2021-09-30 VITALS — WEIGHT: 199.8 LBS | BODY MASS INDEX: 29.51 KG/M2

## 2021-09-30 DIAGNOSIS — H61.23 BILATERAL IMPACTED CERUMEN: ICD-10-CM

## 2021-09-30 DIAGNOSIS — H60.333 CHRONIC SWIMMER'S EAR OF BOTH SIDES: Primary | ICD-10-CM

## 2021-09-30 PROCEDURE — 99214 OFFICE O/P EST MOD 30 MIN: CPT | Performed by: OTOLARYNGOLOGY

## 2021-09-30 PROCEDURE — 69210 REMOVE IMPACTED EAR WAX UNI: CPT | Performed by: OTOLARYNGOLOGY

## 2021-10-01 ENCOUNTER — OFFICE VISIT (OUTPATIENT)
Dept: PULMONOLOGY | Age: 81
End: 2021-10-01

## 2021-10-01 VITALS
HEART RATE: 75 BPM | BODY MASS INDEX: 30.16 KG/M2 | TEMPERATURE: 97.4 F | OXYGEN SATURATION: 96 % | HEIGHT: 69 IN | RESPIRATION RATE: 18 BRPM | WEIGHT: 203.6 LBS | SYSTOLIC BLOOD PRESSURE: 136 MMHG | DIASTOLIC BLOOD PRESSURE: 78 MMHG

## 2021-10-01 DIAGNOSIS — R91.8 MULTIPLE LUNG NODULES ON CT: Primary | ICD-10-CM

## 2021-10-01 DIAGNOSIS — R91.1 SOLITARY PULMONARY NODULE: Primary | ICD-10-CM

## 2021-10-01 PROCEDURE — 99214 OFFICE O/P EST MOD 30 MIN: CPT | Performed by: INTERNAL MEDICINE

## 2021-10-04 ENCOUNTER — OFFICE VISIT (OUTPATIENT)
Dept: OTOLARYNGOLOGY | Age: 81
End: 2021-10-04

## 2021-10-04 ENCOUNTER — OFFICE VISIT (OUTPATIENT)
Dept: PODIATRY | Age: 81
End: 2021-10-04

## 2021-10-04 VITALS — WEIGHT: 197 LBS | BODY MASS INDEX: 29.18 KG/M2 | HEIGHT: 69 IN

## 2021-10-04 DIAGNOSIS — M20.41 HAMMER TOES OF BOTH FEET: ICD-10-CM

## 2021-10-04 DIAGNOSIS — L60.8 INCURVATED NAIL: ICD-10-CM

## 2021-10-04 DIAGNOSIS — M20.42 HAMMER TOES OF BOTH FEET: ICD-10-CM

## 2021-10-04 DIAGNOSIS — H60.331 CHRONIC SWIMMER'S EAR OF RIGHT SIDE: Primary | ICD-10-CM

## 2021-10-04 DIAGNOSIS — E11.42 TYPE 2 DIABETES MELLITUS WITH DIABETIC POLYNEUROPATHY, WITHOUT LONG-TERM CURRENT USE OF INSULIN (CMD): Primary | ICD-10-CM

## 2021-10-04 PROCEDURE — 92504 EAR MICROSCOPY EXAMINATION: CPT | Performed by: OTOLARYNGOLOGY

## 2021-10-04 PROCEDURE — 11719 TRIM NAIL(S) ANY NUMBER: CPT | Performed by: PODIATRIST

## 2021-10-04 PROCEDURE — 99213 OFFICE O/P EST LOW 20 MIN: CPT | Performed by: PODIATRIST

## 2021-10-04 PROCEDURE — 99213 OFFICE O/P EST LOW 20 MIN: CPT | Performed by: OTOLARYNGOLOGY

## 2021-10-12 ENCOUNTER — OFFICE VISIT (OUTPATIENT)
Dept: OTOLARYNGOLOGY | Age: 81
End: 2021-10-12

## 2021-10-12 DIAGNOSIS — B36.9 FUNGAL OTITIS EXTERNA: Primary | ICD-10-CM

## 2021-10-12 DIAGNOSIS — H62.40 FUNGAL OTITIS EXTERNA: Primary | ICD-10-CM

## 2021-10-12 PROCEDURE — 99213 OFFICE O/P EST LOW 20 MIN: CPT | Performed by: OTOLARYNGOLOGY

## 2021-10-13 ENCOUNTER — IMAGING SERVICES (OUTPATIENT)
Dept: PET IMAGING | Age: 81
End: 2021-10-13
Attending: INTERNAL MEDICINE

## 2021-10-13 DIAGNOSIS — R91.8 MULTIPLE LUNG NODULES ON CT: ICD-10-CM

## 2021-10-13 PROCEDURE — X1094 NO CHARGE VISIT: HCPCS

## 2021-10-13 RX ORDER — FLUDEOXYGLUCOSE F-18 300 MCI/ML
15.25 INJECTION INTRAVENOUS ONCE
Status: COMPLETED | OUTPATIENT
Start: 2021-10-13 | End: 2021-10-13

## 2021-10-13 RX ADMIN — FLUDEOXYGLUCOSE F-18 15.25 MILLICURIE: 300 INJECTION INTRAVENOUS at 07:21

## 2021-10-19 ENCOUNTER — APPOINTMENT (OUTPATIENT)
Dept: OTOLARYNGOLOGY | Age: 81
End: 2021-10-19

## 2021-11-01 RX ORDER — FINASTERIDE 5 MG/1
TABLET, FILM COATED ORAL
Qty: 90 TABLET | Refills: 0 | Status: SHIPPED | OUTPATIENT
Start: 2021-11-01 | End: 2022-04-25 | Stop reason: ALTCHOICE

## 2021-11-17 ENCOUNTER — OFFICE VISIT (OUTPATIENT)
Dept: URBAN - METROPOLITAN AREA CLINIC 26 | Facility: CLINIC | Age: 81
End: 2021-11-17
Payer: MEDICARE

## 2021-11-17 DIAGNOSIS — E11.3293 TYPE 2 DIAB W MILD NONPRLF DIABETIC RTNOP W/O MACULAR EDEMA, BILATERAL: Primary | ICD-10-CM

## 2021-11-17 DIAGNOSIS — H25.812 COMBINED FORMS OF AGE-RELATED CATARACT, LEFT EYE: ICD-10-CM

## 2021-11-17 DIAGNOSIS — Z79.84 LONG TERM (CURRENT) USE OF ORAL HYPOGLYCEMIC DRUGS: ICD-10-CM

## 2021-11-17 DIAGNOSIS — H52.4 PRESBYOPIA: ICD-10-CM

## 2021-11-17 DIAGNOSIS — H04.123 TEAR FILM INSUFFICIENCY OF BILATERAL LACRIMAL GLANDS: ICD-10-CM

## 2021-11-17 DIAGNOSIS — Z96.1 PRESENCE OF INTRAOCULAR LENS: ICD-10-CM

## 2021-11-17 PROCEDURE — 92134 CPTRZ OPH DX IMG PST SGM RTA: CPT | Performed by: OPTOMETRIST

## 2021-11-17 PROCEDURE — 92004 COMPRE OPH EXAM NEW PT 1/>: CPT | Performed by: OPTOMETRIST

## 2021-11-17 ASSESSMENT — INTRAOCULAR PRESSURE
OD: 17
OS: 18

## 2021-11-17 ASSESSMENT — KERATOMETRY
OS: 43.50
OD: 43.25

## 2021-11-17 ASSESSMENT — VISUAL ACUITY
OS: 20/30
OD: 20/20

## 2021-11-17 NOTE — IMPRESSION/PLAN
Impression: Type 2 diab w mild nonprlf diabetic rtnop w/o macular edema, bilateral: B46.8603. Plan: Mild Non-Proliferative Diabetic Retinopathy, no Diabetic Macular Edema and no Neovascularization of the iris, disc, or elsewhere. Discussed ocular and systemic benefits of blood sugar control. Check annually.

## 2021-11-30 RX ORDER — FINASTERIDE 5 MG/1
TABLET, FILM COATED ORAL
Qty: 90 TABLET | Refills: 0 | OUTPATIENT
Start: 2021-11-30

## 2021-12-23 ENCOUNTER — TELEPHONE (OUTPATIENT)
Dept: INTERNAL MEDICINE | Age: 81
End: 2021-12-23

## 2022-02-03 RX ORDER — LISINOPRIL 10 MG/1
TABLET ORAL
Qty: 90 TABLET | Refills: 0 | Status: SHIPPED | OUTPATIENT
Start: 2022-02-03 | End: 2022-05-25

## 2022-02-07 RX ORDER — OMEPRAZOLE 40 MG/1
CAPSULE, DELAYED RELEASE ORAL
Qty: 90 CAPSULE | Refills: 0 | Status: SHIPPED | OUTPATIENT
Start: 2022-02-07 | End: 2022-05-24

## 2022-02-08 ENCOUNTER — TELEPHONE (OUTPATIENT)
Dept: INTERNAL MEDICINE | Age: 82
End: 2022-02-08

## 2022-02-09 ENCOUNTER — TELEPHONE (OUTPATIENT)
Dept: INTERNAL MEDICINE | Age: 82
End: 2022-02-09

## 2022-02-21 ENCOUNTER — TELEPHONE (OUTPATIENT)
Dept: INTERNAL MEDICINE | Age: 82
End: 2022-02-21

## 2022-02-21 DIAGNOSIS — D47.2 MGUS (MONOCLONAL GAMMOPATHY OF UNKNOWN SIGNIFICANCE): ICD-10-CM

## 2022-02-21 DIAGNOSIS — I10 HYPERTENSION, UNSPECIFIED TYPE: ICD-10-CM

## 2022-02-21 DIAGNOSIS — E11.42 TYPE 2 DIABETES MELLITUS WITH DIABETIC POLYNEUROPATHY, WITHOUT LONG-TERM CURRENT USE OF INSULIN (CMD): ICD-10-CM

## 2022-02-21 DIAGNOSIS — R79.89 ABNORMAL CBC: Primary | ICD-10-CM

## 2022-04-18 ENCOUNTER — LAB SERVICES (OUTPATIENT)
Dept: FAMILY MEDICINE | Age: 82
End: 2022-04-18

## 2022-04-18 DIAGNOSIS — R79.89 ABNORMAL CBC: ICD-10-CM

## 2022-04-18 DIAGNOSIS — E11.42 TYPE 2 DIABETES MELLITUS WITH DIABETIC POLYNEUROPATHY, WITHOUT LONG-TERM CURRENT USE OF INSULIN (CMD): ICD-10-CM

## 2022-04-18 DIAGNOSIS — D47.2 MGUS (MONOCLONAL GAMMOPATHY OF UNKNOWN SIGNIFICANCE): ICD-10-CM

## 2022-04-18 DIAGNOSIS — I10 HYPERTENSION, UNSPECIFIED TYPE: ICD-10-CM

## 2022-04-18 LAB
ALBUMIN SERPL-MCNC: 4.1 G/DL (ref 3.6–5.1)
ALP SERPL-CCNC: 85 U/L (ref 45–115)
ALT SERPL W/O P-5'-P-CCNC: 19 U/L (ref 5–49)
AST SERPL-CCNC: 23 U/L (ref 14–43)
BASOPHIL %: 1 % (ref 0–1.2)
BASOPHIL ABSOLUTE #: 0.1 10*3/UL (ref 0–0.1)
BILIRUB SERPL-MCNC: 0.4 MG/DL (ref 0–1.3)
BUN SERPL-MCNC: 29 MG/DL (ref 6–27)
CALCIUM SERPL-MCNC: 9 MG/DL (ref 8.6–10.6)
CHLORIDE SERPL-SCNC: 106 MMOL/L (ref 96–107)
CHOLEST SERPL-MCNC: 173 MG/DL (ref 140–200)
CO2 SERPL-SCNC: 25 MMOL/L (ref 22–32)
CREAT SERPL-MCNC: 1.8 MG/DL (ref 0.6–1.6)
DIFFERENTIAL TYPE: ABNORMAL
EOSINOPHIL %: 5.2 % (ref 0–10)
EOSINOPHIL ABSOLUTE #: 0.5 10*3/UL (ref 0–0.5)
EST. AVERAGE GLUCOSE BLD GHB EST-MCNC: 138 MG/DL (ref 0–154)
GFR SERPL CREATININE-BSD FRML MDRD: 36 ML/MIN/{1.73M2}
GFR SERPL CREATININE-BSD FRML MDRD: 44 ML/MIN/{1.73M2}
GLUCOSE P FAST SERPL-MCNC: 107 MG/DL (ref 60–100)
HBA1C MFR BLD: 6.4 % (ref 4.2–6)
HDLC SERPL-MCNC: 43 MG/DL
HEMATOCRIT: 47.7 % (ref 40–51)
HEMOGLOBIN: 15.3 G/DL (ref 13.7–17.5)
IMMATURE GRANULOCYTE ABSOLUTE: 0.02 10*3/UL (ref 0–0.05)
IMMATURE GRANULOCYTE PERCENT: 0.2 % (ref 0–0.5)
LDLC SERPL CALC-MCNC: 98 MG/DL (ref 30–100)
LYMPH PERCENT: 17.6 % (ref 20.5–51.1)
LYMPHOCYTE ABSOLUTE #: 1.8 10*3/UL (ref 1.2–3.4)
MEAN CORPUSCULAR HGB CONCENTRATION: 32.1 % (ref 32–36)
MEAN CORPUSCULAR HGB: 30.4 PG (ref 27–34)
MEAN CORPUSCULAR VOLUME: 94.8 FL (ref 79–95)
MEAN PLATELET VOLUME: 10.5 FL (ref 8.6–12.4)
MONOCYTE ABSOLUTE #: 1 10*3/UL (ref 0.2–0.9)
MONOCYTE PERCENT: 10 % (ref 4.3–12.9)
NEUTROPHIL ABSOLUTE #: 6.9 10*3/UL (ref 1.4–6.5)
NEUTROPHIL PERCENT: 66 % (ref 34–73.5)
PLATELET COUNT: 249 10*3/UL (ref 150–400)
POTASSIUM SERPL-SCNC: 4.8 MMOL/L (ref 3.5–5.3)
PROT SERPL-MCNC: 7.4 G/DL (ref 6.4–8.5)
RED BLOOD CELL COUNT: 5.03 10*6/UL (ref 3.9–5.7)
RED CELL DISTRIBUTION WIDTH: 14.7 % (ref 11.3–14.8)
SODIUM SERPL-SCNC: 139 MMOL/L (ref 136–146)
TRIGL SERPL-MCNC: 161 MG/DL (ref 0–200)
WHITE BLOOD CELL COUNT: 10.5 10*3/UL (ref 4–10)

## 2022-04-18 PROCEDURE — 85025 COMPLETE CBC W/AUTO DIFF WBC: CPT | Performed by: INTERNAL MEDICINE

## 2022-04-18 PROCEDURE — 80061 LIPID PANEL: CPT | Performed by: INTERNAL MEDICINE

## 2022-04-18 PROCEDURE — 80053 COMPREHEN METABOLIC PANEL: CPT | Performed by: INTERNAL MEDICINE

## 2022-04-18 PROCEDURE — 83036 HEMOGLOBIN GLYCOSYLATED A1C: CPT | Performed by: INTERNAL MEDICINE

## 2022-04-18 PROCEDURE — 36415 COLL VENOUS BLD VENIPUNCTURE: CPT | Performed by: INTERNAL MEDICINE

## 2022-04-19 NOTE — PROGRESS NOTES
[facility]                INFECTIOUS DISEASE PROGRESS NOTE    Gibran Nguyenclive Patient Status:  Inpatient    1940 MRN [id number]   [facility] 3NW-A Attending Jaylin Cintron MD   Hosp Day # 2 PCP ALICIA DUNAWAY     Antibiotics: LVM to contact clinic  Regarding a referral to Gastroenterology, letter has been mailed   CA 8.4* 8.3* 8.1* 7.9*   ALB 1.9*  --  1.4*  --    * 139 140 139   K 4.6 4.0 4.1 4.0    109 110 109   CO2 22.0 23.0 23.0 23.0   ALKPHO 110  --  96  --    AST 57*  --  21  --    ALT 44  --  27  --    BILT 0.7  --  0.6  --    TP 7.2  --  6.1*

## 2022-04-25 ENCOUNTER — OFFICE VISIT (OUTPATIENT)
Dept: INTERNAL MEDICINE | Age: 82
End: 2022-04-25

## 2022-04-25 VITALS
BODY MASS INDEX: 28.14 KG/M2 | TEMPERATURE: 96.9 F | OXYGEN SATURATION: 95 % | RESPIRATION RATE: 16 BRPM | SYSTOLIC BLOOD PRESSURE: 116 MMHG | WEIGHT: 190 LBS | HEART RATE: 79 BPM | HEIGHT: 69 IN | DIASTOLIC BLOOD PRESSURE: 72 MMHG

## 2022-04-25 DIAGNOSIS — I82.513 CHRONIC EMBOLISM AND THROMBOSIS OF BOTH FEMORAL VEINS (CMD): ICD-10-CM

## 2022-04-25 DIAGNOSIS — N18.30 STAGE 3 CHRONIC KIDNEY DISEASE, UNSPECIFIED WHETHER STAGE 3A OR 3B CKD (CMD): ICD-10-CM

## 2022-04-25 DIAGNOSIS — E11.42 TYPE 2 DIABETES MELLITUS WITH DIABETIC POLYNEUROPATHY, WITHOUT LONG-TERM CURRENT USE OF INSULIN (CMD): Primary | ICD-10-CM

## 2022-04-25 DIAGNOSIS — C90.00 MULTIPLE MYELOMA NOT HAVING ACHIEVED REMISSION (CMD): ICD-10-CM

## 2022-04-25 DIAGNOSIS — G56.03 BILATERAL CARPAL TUNNEL SYNDROME: ICD-10-CM

## 2022-04-25 DIAGNOSIS — M35.3 PMR (POLYMYALGIA RHEUMATICA) (CMD): ICD-10-CM

## 2022-04-25 PROCEDURE — 99214 OFFICE O/P EST MOD 30 MIN: CPT | Performed by: INTERNAL MEDICINE

## 2022-04-25 ASSESSMENT — ENCOUNTER SYMPTOMS
SHORTNESS OF BREATH: 0
CHILLS: 0
FEVER: 0

## 2022-04-25 ASSESSMENT — PATIENT HEALTH QUESTIONNAIRE - PHQ9
CLINICAL INTERPRETATION OF PHQ2 SCORE: NO FURTHER SCREENING NEEDED
1. LITTLE INTEREST OR PLEASURE IN DOING THINGS: NOT AT ALL
2. FEELING DOWN, DEPRESSED OR HOPELESS: NOT AT ALL
SUM OF ALL RESPONSES TO PHQ9 QUESTIONS 1 AND 2: 0
SUM OF ALL RESPONSES TO PHQ9 QUESTIONS 1 AND 2: 0

## 2022-05-24 RX ORDER — OMEPRAZOLE 40 MG/1
CAPSULE, DELAYED RELEASE ORAL
Qty: 90 CAPSULE | Refills: 3 | Status: SHIPPED | OUTPATIENT
Start: 2022-05-24

## 2022-05-25 RX ORDER — LISINOPRIL 10 MG/1
TABLET ORAL
Qty: 90 TABLET | Refills: 0 | Status: SHIPPED | OUTPATIENT
Start: 2022-05-25

## 2022-05-26 ENCOUNTER — TELEPHONE (OUTPATIENT)
Dept: PULMONOLOGY | Age: 82
End: 2022-05-26

## 2022-08-18 ENCOUNTER — TELEPHONE (OUTPATIENT)
Dept: INTERNAL MEDICINE | Age: 82
End: 2022-08-18

## 2022-09-02 ENCOUNTER — TELEPHONE (OUTPATIENT)
Dept: INTERNAL MEDICINE | Age: 82
End: 2022-09-02

## 2022-09-06 RX ORDER — MONTELUKAST SODIUM 10 MG/1
TABLET ORAL
Qty: 90 TABLET | Refills: 0 | Status: SHIPPED | OUTPATIENT
Start: 2022-09-06 | End: 2022-09-06 | Stop reason: SDUPTHER

## 2022-09-06 RX ORDER — MONTELUKAST SODIUM 10 MG/1
TABLET ORAL
Qty: 90 TABLET | Refills: 0 | Status: SHIPPED | OUTPATIENT
Start: 2022-09-06 | End: 2022-09-06

## 2022-09-07 RX ORDER — LISINOPRIL 10 MG/1
10 TABLET ORAL DAILY
Qty: 90 TABLET | Refills: 0 | OUTPATIENT
Start: 2022-09-07

## 2022-09-07 RX ORDER — LISINOPRIL 10 MG/1
TABLET ORAL
Qty: 90 TABLET | Refills: 0 | OUTPATIENT
Start: 2022-09-07

## 2022-09-07 RX ORDER — MONTELUKAST SODIUM 10 MG/1
10 TABLET ORAL EVERY EVENING
Qty: 90 TABLET | Refills: 0 | Status: SHIPPED | OUTPATIENT
Start: 2022-09-07

## 2022-09-09 RX ORDER — MONTELUKAST SODIUM 10 MG/1
TABLET ORAL
Qty: 90 TABLET | Refills: 0 | OUTPATIENT
Start: 2022-09-09

## 2022-09-26 ENCOUNTER — APPOINTMENT (OUTPATIENT)
Dept: PODIATRY | Age: 82
End: 2022-09-26

## 2022-10-27 RX ORDER — LISINOPRIL 10 MG/1
TABLET ORAL
Qty: 90 TABLET | Refills: 0 | OUTPATIENT
Start: 2022-10-27

## 2023-06-30 ENCOUNTER — TELEPHONE (OUTPATIENT)
Dept: FAMILY MEDICINE | Age: 83
End: 2023-06-30